# Patient Record
Sex: MALE | Race: WHITE | Employment: OTHER | URBAN - METROPOLITAN AREA
[De-identification: names, ages, dates, MRNs, and addresses within clinical notes are randomized per-mention and may not be internally consistent; named-entity substitution may affect disease eponyms.]

---

## 2017-02-21 ENCOUNTER — HOSPITAL ENCOUNTER (OUTPATIENT)
Dept: GENERAL RADIOLOGY | Age: 67
Discharge: HOME | End: 2017-02-21
Attending: GENERAL PRACTICE | Admitting: GENERAL PRACTICE

## 2017-02-21 DIAGNOSIS — R05.9 COUGH: ICD-10-CM

## 2021-01-09 ENCOUNTER — HOSPITAL ENCOUNTER (EMERGENCY)
Age: 71
Discharge: HOME | End: 2021-01-09
Attending: EMERGENCY MEDICINE
Payer: MEDICARE

## 2021-01-09 ENCOUNTER — APPOINTMENT (OUTPATIENT)
Dept: GENERAL RADIOLOGY | Age: 71
End: 2021-01-09
Payer: MEDICARE

## 2021-01-09 VITALS
OXYGEN SATURATION: 92 % | DIASTOLIC BLOOD PRESSURE: 81 MMHG | TEMPERATURE: 98.8 F | WEIGHT: 195 LBS | HEIGHT: 67 IN | BODY MASS INDEX: 30.61 KG/M2 | HEART RATE: 84 BPM | SYSTOLIC BLOOD PRESSURE: 144 MMHG | RESPIRATION RATE: 25 BRPM

## 2021-01-09 DIAGNOSIS — R53.83 OTHER FATIGUE: Primary | ICD-10-CM

## 2021-01-09 LAB
ALBUMIN SERPL-MCNC: 3.6 GM/DL (ref 3.4–5)
ALP BLD-CCNC: 44 IU/L (ref 40–129)
ALT SERPL-CCNC: 60 U/L (ref 10–40)
ANION GAP SERPL CALCULATED.3IONS-SCNC: 13 MMOL/L (ref 4–16)
AST SERPL-CCNC: 52 IU/L (ref 15–37)
BASOPHILS ABSOLUTE: 0 K/CU MM
BASOPHILS RELATIVE PERCENT: 0.2 % (ref 0–1)
BILIRUB SERPL-MCNC: 0.8 MG/DL (ref 0–1)
BUN BLDV-MCNC: 15 MG/DL (ref 6–23)
CALCIUM SERPL-MCNC: 8.8 MG/DL (ref 8.3–10.6)
CHLORIDE BLD-SCNC: 94 MMOL/L (ref 99–110)
CO2: 24 MMOL/L (ref 21–32)
CREAT SERPL-MCNC: 0.9 MG/DL (ref 0.9–1.3)
DIFFERENTIAL TYPE: ABNORMAL
EOSINOPHILS ABSOLUTE: 0 K/CU MM
EOSINOPHILS RELATIVE PERCENT: 0 % (ref 0–3)
GFR AFRICAN AMERICAN: >60 ML/MIN/1.73M2
GFR NON-AFRICAN AMERICAN: >60 ML/MIN/1.73M2
GLUCOSE BLD-MCNC: 211 MG/DL (ref 70–99)
GLUCOSE BLD-MCNC: 83 MG/DL (ref 70–99)
HCT VFR BLD CALC: 47.6 % (ref 42–52)
HEMOGLOBIN: 15.4 GM/DL (ref 13.5–18)
IMMATURE NEUTROPHIL %: 0.3 % (ref 0–0.43)
LYMPHOCYTES ABSOLUTE: 1.5 K/CU MM
LYMPHOCYTES RELATIVE PERCENT: 25 % (ref 24–44)
MCH RBC QN AUTO: 27.5 PG (ref 27–31)
MCHC RBC AUTO-ENTMCNC: 32.4 % (ref 32–36)
MCV RBC AUTO: 84.8 FL (ref 78–100)
MONOCYTES ABSOLUTE: 0.5 K/CU MM
MONOCYTES RELATIVE PERCENT: 7.7 % (ref 0–4)
NUCLEATED RBC %: 0 %
PDW BLD-RTO: 15 % (ref 11.7–14.9)
PLATELET # BLD: 141 K/CU MM (ref 140–440)
PMV BLD AUTO: 10.8 FL (ref 7.5–11.1)
POTASSIUM SERPL-SCNC: 3.7 MMOL/L (ref 3.5–5.1)
RBC # BLD: 5.61 M/CU MM (ref 4.6–6.2)
SEGMENTED NEUTROPHILS ABSOLUTE COUNT: 4 K/CU MM
SEGMENTED NEUTROPHILS RELATIVE PERCENT: 66.8 % (ref 36–66)
SODIUM BLD-SCNC: 131 MMOL/L (ref 135–145)
TOTAL IMMATURE NEUTOROPHIL: 0.02 K/CU MM
TOTAL NUCLEATED RBC: 0 K/CU MM
TOTAL PROTEIN: 7.3 GM/DL (ref 6.4–8.2)
TROPONIN T: <0.01 NG/ML
WBC # BLD: 6 K/CU MM (ref 4–10.5)

## 2021-01-09 PROCEDURE — 93005 ELECTROCARDIOGRAM TRACING: CPT | Performed by: EMERGENCY MEDICINE

## 2021-01-09 PROCEDURE — 82962 GLUCOSE BLOOD TEST: CPT

## 2021-01-09 PROCEDURE — 80053 COMPREHEN METABOLIC PANEL: CPT

## 2021-01-09 PROCEDURE — 93010 ELECTROCARDIOGRAM REPORT: CPT | Performed by: INTERNAL MEDICINE

## 2021-01-09 PROCEDURE — 71045 X-RAY EXAM CHEST 1 VIEW: CPT

## 2021-01-09 PROCEDURE — U0002 COVID-19 LAB TEST NON-CDC: HCPCS

## 2021-01-09 PROCEDURE — 85025 COMPLETE CBC W/AUTO DIFF WBC: CPT

## 2021-01-09 PROCEDURE — 99283 EMERGENCY DEPT VISIT LOW MDM: CPT

## 2021-01-09 PROCEDURE — 84484 ASSAY OF TROPONIN QUANT: CPT

## 2021-01-09 NOTE — ED NOTES
Discharge instructions and follow up reviewed with patient. Voiced understanding.      Ekta Diaz RN  01/09/21 7124

## 2021-01-09 NOTE — ED PROVIDER NOTES
Shellie CLEMENT [address] ENCOUNTER    Patient: Tsering Baker  MRN: [id number]  : 1950  Date of Evaluation: 2021  ED Provider:  [address] [facility] [place] [facility] COMPLAINT  Chief Complaint   Patient presents with    Fatigue     reports feeling tired for 3 days    Fever     99.1       HPI  Tsering Baker is a 65 y.o. male who presents with complaints of moderate severity, constant fatigue for the last couple of days as well as a mildly elevated temperature between 99.0 to 99.5 °F today. He is unaware of any triggering or modifying factors. He reports only the symptom of fatigue and denies any other associated symptoms or complaints or concerns. Denies any other associated symptoms or complaints or concerns. REVIEW OF SYSTEMS    Constitutional: negative for fever, chills  Neurological: negative for HA, focal weakness, loss of sensation  Ophthalmic: negative for vision change  ENT: negative for congestion, rhinorrhea, sore throat, earaches, loss of taste or smelling  Cardiovascular: negative for chest pain  Respiratory: negative for SOB, cough  GI: negative for abdominal pain, nausea, vomiting, diarrhea, constipation  : negative for dysuria, hematuria  Musculoskeletal: negative for neck stiffness, myalgias, decreased ROM, joint swelling  Dermatological: negative for rash, wounds  Heme: Negative for bleeding, bruising      PAST MEDICAL HISTORY  Past Medical History:   Diagnosis Date    Diabetes mellitus ([facility] [address]     Hyperlipidemia     Hypertension        CURRENT MEDICATIONS  [unfilled]    ALLERGIES  No Known Allergies    SURGICAL HISTORY  Past Surgical History:   Procedure Laterality Date    COLONOSCOPY  2015    normal colonoscopy    ENDOSCOPY, COLON, DIAGNOSTIC      prepyloric uler    UPPER GASTROINTESTINAL ENDOSCOPY         FAMILY HISTORY  History reviewed. No pertinent family history.     SOCIAL HISTORY  Social History     Socioeconomic History    Marital status:      Spouse name: None    Number of children: None    Years of education: None    Highest education level: None   Occupational History    None   Social Needs    Financial resource strain: None    Food insecurity     Worry: None     Inability: None    Transportation needs     Medical: None     Non-medical: None   Tobacco Use    Smoking status: Former Smoker    Smokeless tobacco: Never Used   Substance and Sexual Activity    Alcohol use: No    Drug use: No    Sexual activity: None   Lifestyle    Physical activity     Days per week: None     Minutes per session: None    Stress: None   Relationships    Social connections     Talks on phone: None     Gets together: None     Attends Sabianism service: None     Active member of club or organization: None     Attends meetings of clubs or organizations: None     Relationship status: None    Intimate partner violence     Fear of current or ex partner: None     Emotionally abused: None     Physically abused: None     Forced sexual activity: None   Other Topics Concern    None   Social History Narrative    None         **Past medical, family and social histories, and nursing notes reviewed and verified by me**      PHYSICAL EXAM  VITAL SIGNS:   ED Triage Vitals [01/09/21 1111]   Enc Vitals Group      BP (!) 147/83      Pulse 102      Resp 19      Temp 98.8 °F (37.1 °C)      Temp Source Oral      SpO2 96 %      Weight 195 lb (88.5 kg)      Height 5' 7\" (1.702 m)      Head Circumference       Peak Flow       Pain Score       Pain Loc       Pain Edu? Excl. in [address]? Vitals during ED course were reviewed and are as charted.     Constitutional: Minimal distress, Non-toxic appearance  Eyes: Conjunctiva normal, No discharge, PERRL, EOMI  HENT: Normocephalic, Atraumatic, bilateral external ears normal, bilateral TMs appear normal, no tenderness to percussion over the bilateral mastoid processes, no tenderness to percussion over the frontal or bilateral maxillary sinuses, posterior oropharynx is nonerythematous and without exudate, uvula is midline, no trismus, no \"hot potato voice\" or dysphonia, oropharynx moist  Neck: Supple, no nuchal rigidity/meningismus with negative Kernig and Brudzinski signs, no stridor, no grossly visible or palpable masses  Cardiovascular: Regular rate and rhythm, No murmurs, No rubs, No gallops  Pulmonary/Chest: Normal breath sounds, No respiratory distress or accessory muscle use, No wheezing, crackles or rhonchi. Abdomen: Soft, nondistended and nonrigid, No tenderness or peritoneal signs, No masses, normal bowel sounds  Back: No midline point tenderness, No paraspinous muscle tenderness.  No CVA tenderness  Extremities: No gross deformities, no edema, no tenderness  Neurologic: Normal motor function, Normal sensory function, No focal deficits  Skin: Warm, Dry, No erythema, No rash, No cyanosis, No mottling  Lymphatic: No lymphadenopathy in the following location(s): cervical  Psychiatric: Alert and oriented x3, Affect normal              RADIOLOGY/PROCEDURES/LABS/MEDICATIONS ADMINISTERED:    I have reviewed and interpreted all of the currently available lab results from this visit (if applicable):  Results for orders placed or performed during the hospital encounter of 01/09/21   CBC auto diff   Result Value Ref Range    WBC 6.0 4.0 - 10.5 K/CU MM    RBC 5.61 4.6 - 6.2 M/CU MM    Hemoglobin 15.4 13.5 - 18.0 GM/DL    Hematocrit 47.6 42 - 52 %    MCV 84.8 78 - 100 FL    MCH 27.5 27 - 31 PG    MCHC 32.4 32.0 - 36.0 %    RDW 15.0 (H) 11.7 - 14.9 %    Platelets 664 126 - 817 K/CU MM    MPV 10.8 7.5 - 11.1 FL    Differential Type AUTOMATED DIFFERENTIAL     Segs Relative 66.8 (H) 36 - 66 %    Lymphocytes % 25.0 24 - 44 %    Monocytes % 7.7 (H) 0 - 4 %    Eosinophils % 0.0 0 - 3 %    Basophils % 0.2 0 - 1 %    Segs Absolute 4.0 K/CU MM    Lymphocytes Absolute 1.5 K/CU MM    Monocytes Absolute 0.5 K/CU MM    Eosinophils Absolute 0.0 K/CU MM    Basophils Absolute 0.0 K/CU MM    Nucleated RBC % 0.0 %    Total Nucleated RBC 0.0 K/CU MM    Total Immature Neutrophil 0.02 K/CU MM    Immature Neutrophil % 0.3 0 - 0.43 %   Troponin   Result Value Ref Range    Troponin T <0.010 <0.01 NG/ML   Comprehensive Metabolic Panel   Result Value Ref Range    Sodium 131 (L) 135 - 145 MMOL/L    Potassium 3.7 3.5 - 5.1 MMOL/L    Chloride 94 (L) 99 - 110 mMol/L    CO2 24 21 - 32 MMOL/L    BUN 15 6 - 23 MG/DL    CREATININE 0.9 0.9 - 1.3 MG/DL    Glucose 83 70 - 99 MG/DL    Calcium 8.8 8.3 - 10.6 MG/DL    Alb 3.6 3.4 - 5.0 GM/DL    Total Protein 7.3 6.4 - 8.2 GM/DL    Total Bilirubin 0.8 0.0 - 1.0 MG/DL    ALT 60 (H) 10 - 40 U/L    AST 52 (H) 15 - 37 IU/L    Alkaline Phosphatase 44 40 - 129 IU/L    GFR Non-African American >60 >60 mL/min/1.73m2    GFR African American >60 >60 mL/min/1.73m2    Anion Gap 13 4 - 16   POCT Glucose   Result Value Ref Range    POC Glucose 211 (H) 70 - 99 MG/DL   EKG 12 Lead   Result Value Ref Range    Ventricular Rate 103 BPM    Atrial Rate 103 BPM    P-R Interval 152 ms    QRS Duration 88 ms    Q-T Interval 326 ms    QTc Calculation (Bazett) 427 ms    P Axis -3 degrees    R Axis -36 degrees    T Axis 56 degrees    Diagnosis       Sinus tachycardia  Left axis deviation  Minimal voltage criteria for LVH, may be normal variant  Abnormal ECG  No previous ECGs available  Confirmed by Jian Lofton MD, Hali Castillo ([id number]) on 1/9/2021 5:03:38 PM            ABNORMAL LABS:  Labs Reviewed   CBC WITH AUTO DIFFERENTIAL - Abnormal; Notable for the following components:       Result Value    RDW 15.0 (*)     Segs Relative 66.8 (*)     Monocytes % 7.7 (*)     All other components within normal limits   COMPREHENSIVE METABOLIC PANEL - Abnormal; Notable for the following components:    Sodium 131 (*)     Chloride 94 (*)     ALT 60 (*)     AST 52 (*)     All other components within normal limits   POCT GLUCOSE - Abnormal; Notable for the following components:    POC Glucose 211 (*)     All other Freddy Holbrook MD  [address] [facility] [phone]    Schedule an appointment as soon as possible for a visit       [facility] Emergency Department  [address] [place] [id number] [phone]    If symptoms worsen      Disposition medications (if applicable):  New Prescriptions    No medications on file       ED Provider Disposition Time  DISPOSITION            Electronically signed by: Erin Gonsalves M.D., 1/9/2021 6:15 PM      This dictation was created with voice recognition software. While attempts have been made to review the dictation as it is transcribed, on occasion the spoken word can be misinterpreted by the technology leading to omissions or inappropriate words, phrases or sentences.         Catherene Goltz, MD  01/10/21 1929

## 2021-01-09 NOTE — ED PROVIDER NOTES
EKG is interpreted by me. EKG shows sinus rhythm at 103 bpm, left axis deviation, unremarkable ST segment elevations or depressions, T waves overall unremarkable, VA interval 152, QRS duration of 88, QTc of 4-27. Final impression, sinus tachycardia.     Manjit Jones  1/9/2021  12:20 PM        Manjit oJnes MD  01/09/21 1220

## 2021-01-09 NOTE — ED NOTES
Patient present to the ED for fatigue that started 3 days ago. Sense then the patient has had a fever and HTN that started this AM. Patient is a diabetic and is BG is 211     Yen Kaur  01/09/21 1118

## 2021-01-11 ENCOUNTER — CARE COORDINATION (OUTPATIENT)
Dept: CARE COORDINATION | Age: 71
End: 2021-01-11

## 2021-01-11 LAB
SARS-COV-2: DETECTED
SOURCE: ABNORMAL

## 2021-01-11 NOTE — CARE COORDINATION
Patient contacted regarding recent visit for viral symptoms. This Jonas Durbin contacted the patient by telephone to perform post discharge call. Verified name and  with patient as identifiers. Provided introduction to self, and reason for call due to viral symptoms of infection and/or exposure to COVID-19. Call within 2 business days of discharge: Yes       Patient presented to emergency department/flu clinic with complaints of viral symptoms/exposure to COVID. Patient reports symptoms are the same. Due to no new or worsening symptoms the RN CTN/JOANNA was not notified for escalation. Discussed exposure protocols and quarantine with CDC Guidelines What To Do If You Are Sick    Patient was given an opportunity for questions and concerns. Stay home except to get medical care    Separate yourself from other people and animals in your home    Call ahead before visiting your doctor    Wear a facemask    Cover your coughs and sneezes    Clean your hands often    Avoid sharing personal household items    Clean all high-touch surfaces everyday    Monitor your symptoms  Seek prompt medical attention if your illness is worsening (e.g., difficulty breathing). Before seeking care, call your healthcare provider and tell them that you have, or are being evaluated for, COVID-19. Put on a facemask before you enter the facility. These steps will help the healthcare provider's office to keep other people in the office or waiting room from getting infected or exposed. Ask your healthcare provider to call the local or [facility] health department. Persons who are placed under If you have a medical emergency and need to call 911, notify the dispatch personnel that you have, or are being evaluated for COVID-19. If possible, put on a facemask before emergency medical services arrive.     The patient agrees to contact the [facility] exposure line [phone], local health department [place] [facility]: ([phone]) and PCP office for questions related to their healthcare. Author provided contact information for future reference. Patient/family/caregiver given information for [employer] and agrees to enroll no  Patient's preferred e-mail:    Patient's preferred phone number:   Based on Loop alert triggers, patient will be contacted by nurse care manager for worsening symptoms. Spoke with pt regarding ED visit on 1.9.21. pt reports feeling okay, just has a cough. Pt reports my chart is active to view COVID results. Pt hasn't followed up with PCP. Pt was given [place] [address] and red [facility] resources. Pt declined follow up calls for symptom recheck.      Marah Suresh  [phone]

## 2021-01-11 NOTE — CARE COORDINATION
Patient contacted regarding recent visit for viral symptoms. This author contacted the patient by telephone to perform post discharge call Call within 2 business days of discharge: Yes    Placed outreach call to pt regarding ED visit on 1.9.21. Left message to return call, will attempt to reach pt again.     Lucas Lynch  [phone]

## 2021-01-12 LAB
EKG ATRIAL RATE: 103 BPM
EKG DIAGNOSIS: NORMAL
EKG P AXIS: -3 DEGREES
EKG P-R INTERVAL: 152 MS
EKG Q-T INTERVAL: 326 MS
EKG QRS DURATION: 88 MS
EKG QTC CALCULATION (BAZETT): 427 MS
EKG R AXIS: -36 DEGREES
EKG T AXIS: 56 DEGREES
EKG VENTRICULAR RATE: 103 BPM

## 2021-01-24 ENCOUNTER — HOSPITAL ENCOUNTER (INPATIENT)
Age: 71
LOS: 6 days | Discharge: HOME | DRG: 177 | End: 2021-01-30
Attending: INTERNAL MEDICINE | Admitting: INTERNAL MEDICINE
Payer: MEDICARE

## 2021-01-24 ENCOUNTER — HOSPITAL ENCOUNTER (EMERGENCY)
Age: 71
Discharge: ANOTHER HOSPITAL | End: 2021-01-24
Attending: EMERGENCY MEDICINE
Payer: MEDICARE

## 2021-01-24 ENCOUNTER — APPOINTMENT (OUTPATIENT)
Dept: CT IMAGING | Age: 71
End: 2021-01-24
Payer: MEDICARE

## 2021-01-24 VITALS
DIASTOLIC BLOOD PRESSURE: 68 MMHG | RESPIRATION RATE: 15 BRPM | WEIGHT: 190 LBS | HEIGHT: 67 IN | TEMPERATURE: 97.5 F | HEART RATE: 87 BPM | OXYGEN SATURATION: 93 % | SYSTOLIC BLOOD PRESSURE: 110 MMHG | BODY MASS INDEX: 29.82 KG/M2

## 2021-01-24 DIAGNOSIS — J96.01 ACUTE RESPIRATORY FAILURE WITH HYPOXIA (HCC): Primary | ICD-10-CM

## 2021-01-24 DIAGNOSIS — R65.10 SIRS (SYSTEMIC INFLAMMATORY RESPONSE SYNDROME) (HCC): ICD-10-CM

## 2021-01-24 DIAGNOSIS — I77.810 AORTIC ECTASIA, THORACIC (HCC): ICD-10-CM

## 2021-01-24 DIAGNOSIS — K76.0 FATTY LIVER: ICD-10-CM

## 2021-01-24 DIAGNOSIS — J18.9 HCAP (HEALTHCARE-ASSOCIATED PNEUMONIA): ICD-10-CM

## 2021-01-24 DIAGNOSIS — U07.1 COVID-19: ICD-10-CM

## 2021-01-24 DIAGNOSIS — A41.9 SEPTICEMIA (HCC): ICD-10-CM

## 2021-01-24 LAB
ALBUMIN SERPL-MCNC: 3.1 GM/DL (ref 3.4–5)
ALP BLD-CCNC: 49 IU/L (ref 40–129)
ALT SERPL-CCNC: 45 U/L (ref 10–40)
ANION GAP SERPL CALCULATED.3IONS-SCNC: 5 MMOL/L (ref 4–16)
AST SERPL-CCNC: 27 IU/L (ref 15–37)
BASE EXCESS MIXED: 3.4 (ref 0–1.2)
BASE EXCESS: ABNORMAL (ref 0–3.3)
BASOPHILS ABSOLUTE: 0 K/CU MM
BASOPHILS RELATIVE PERCENT: 0.1 % (ref 0–1)
BILIRUB SERPL-MCNC: 1.9 MG/DL (ref 0–1)
BUN BLDV-MCNC: 40 MG/DL (ref 6–23)
CALCIUM SERPL-MCNC: 9.9 MG/DL (ref 8.3–10.6)
CHLORIDE BLD-SCNC: 92 MMOL/L (ref 99–110)
CO2 CONTENT: 29.9 MMOL/L (ref 19–24)
CO2: 35 MMOL/L (ref 21–32)
CREAT SERPL-MCNC: 1.4 MG/DL (ref 0.9–1.3)
DIFFERENTIAL TYPE: ABNORMAL
EOSINOPHILS ABSOLUTE: 0.2 K/CU MM
EOSINOPHILS RELATIVE PERCENT: 1.4 % (ref 0–3)
GFR AFRICAN AMERICAN: >60 ML/MIN/1.73M2
GFR NON-AFRICAN AMERICAN: 50 ML/MIN/1.73M2
GLUCOSE BLD-MCNC: 175 MG/DL (ref 70–99)
GLUCOSE BLD-MCNC: 500 MG/DL (ref 70–99)
HCO3 VENOUS: 28.6 MMOL/L (ref 19–25)
HCT VFR BLD CALC: 43.7 % (ref 42–52)
HEMOGLOBIN: 14.6 GM/DL (ref 13.5–18)
IMMATURE NEUTROPHIL %: 1.2 % (ref 0–0.43)
LYMPHOCYTES ABSOLUTE: 0.8 K/CU MM
LYMPHOCYTES RELATIVE PERCENT: 6.1 % (ref 24–44)
MCH RBC QN AUTO: 27.7 PG (ref 27–31)
MCHC RBC AUTO-ENTMCNC: 33.4 % (ref 32–36)
MCV RBC AUTO: 82.9 FL (ref 78–100)
MONOCYTES ABSOLUTE: 0.4 K/CU MM
MONOCYTES RELATIVE PERCENT: 2.9 % (ref 0–4)
O2 SAT, VEN: 51.3 % (ref 50–70)
PCO2, VEN: 44.1 MMHG (ref 38–52)
PDW BLD-RTO: 14.9 % (ref 11.7–14.9)
PH VENOUS: 7.42 (ref 7.32–7.42)
PLATELET # BLD: 218 K/CU MM (ref 140–440)
PMV BLD AUTO: 10.6 FL (ref 7.5–11.1)
PO2, VEN: 27.1 MMHG (ref 28–48)
POTASSIUM SERPL-SCNC: 4.6 MMOL/L (ref 3.5–5.1)
PRO-BNP: 78.66 PG/ML
RBC # BLD: 5.27 M/CU MM (ref 4.6–6.2)
SEGMENTED NEUTROPHILS ABSOLUTE COUNT: 11.6 K/CU MM
SEGMENTED NEUTROPHILS RELATIVE PERCENT: 88.3 % (ref 36–66)
SODIUM BLD-SCNC: 132 MMOL/L (ref 135–145)
SOURCE, BLOOD GAS: ABNORMAL
TOTAL IMMATURE NEUTOROPHIL: 0.16 K/CU MM
TOTAL PROTEIN: 7.1 GM/DL (ref 6.4–8.2)
TROPONIN T: <0.01 NG/ML
WBC # BLD: 13.1 K/CU MM (ref 4–10.5)

## 2021-01-24 PROCEDURE — 82436 ASSAY OF URINE CHLORIDE: CPT

## 2021-01-24 PROCEDURE — 99285 EMERGENCY DEPT VISIT HI MDM: CPT

## 2021-01-24 PROCEDURE — 6360000004 HC RX CONTRAST MEDICATION: Performed by: EMERGENCY MEDICINE

## 2021-01-24 PROCEDURE — 87449 NOS EACH ORGANISM AG IA: CPT

## 2021-01-24 PROCEDURE — 6360000002 HC RX W HCPCS: Performed by: EMERGENCY MEDICINE

## 2021-01-24 PROCEDURE — 80053 COMPREHEN METABOLIC PANEL: CPT

## 2021-01-24 PROCEDURE — 85025 COMPLETE CBC W/AUTO DIFF WBC: CPT

## 2021-01-24 PROCEDURE — 94640 AIRWAY INHALATION TREATMENT: CPT

## 2021-01-24 PROCEDURE — 83935 ASSAY OF URINE OSMOLALITY: CPT

## 2021-01-24 PROCEDURE — 83880 ASSAY OF NATRIURETIC PEPTIDE: CPT

## 2021-01-24 PROCEDURE — 96375 TX/PRO/DX INJ NEW DRUG ADDON: CPT

## 2021-01-24 PROCEDURE — 82570 ASSAY OF URINE CREATININE: CPT

## 2021-01-24 PROCEDURE — XW13325 TRANSFUSION OF CONVALESCENT PLASMA (NONAUTOLOGOUS) INTO PERIPHERAL VEIN, PERCUTANEOUS APPROACH, NEW TECHNOLOGY GROUP 5: ICD-10-PCS | Performed by: INTERNAL MEDICINE

## 2021-01-24 PROCEDURE — XW033E5 INTRODUCTION OF REMDESIVIR ANTI-INFECTIVE INTO PERIPHERAL VEIN, PERCUTANEOUS APPROACH, NEW TECHNOLOGY GROUP 5: ICD-10-PCS | Performed by: INTERNAL MEDICINE

## 2021-01-24 PROCEDURE — 1200000000 HC SEMI PRIVATE

## 2021-01-24 PROCEDURE — 87899 AGENT NOS ASSAY W/OPTIC: CPT

## 2021-01-24 PROCEDURE — 84484 ASSAY OF TROPONIN QUANT: CPT

## 2021-01-24 PROCEDURE — 86850 RBC ANTIBODY SCREEN: CPT

## 2021-01-24 PROCEDURE — 71275 CT ANGIOGRAPHY CHEST: CPT

## 2021-01-24 PROCEDURE — 93005 ELECTROCARDIOGRAM TRACING: CPT | Performed by: EMERGENCY MEDICINE

## 2021-01-24 PROCEDURE — 96365 THER/PROPH/DIAG IV INF INIT: CPT

## 2021-01-24 PROCEDURE — 84133 ASSAY OF URINE POTASSIUM: CPT

## 2021-01-24 PROCEDURE — 2580000003 HC RX 258: Performed by: EMERGENCY MEDICINE

## 2021-01-24 PROCEDURE — 83036 HEMOGLOBIN GLYCOSYLATED A1C: CPT

## 2021-01-24 PROCEDURE — 6370000000 HC RX 637 (ALT 250 FOR IP): Performed by: PHYSICIAN ASSISTANT

## 2021-01-24 PROCEDURE — 86900 BLOOD TYPING SEROLOGIC ABO: CPT

## 2021-01-24 PROCEDURE — 96367 TX/PROPH/DG ADDL SEQ IV INF: CPT

## 2021-01-24 PROCEDURE — 96366 THER/PROPH/DIAG IV INF ADDON: CPT

## 2021-01-24 PROCEDURE — 6360000002 HC RX W HCPCS: Performed by: INTERNAL MEDICINE

## 2021-01-24 PROCEDURE — 2580000003 HC RX 258: Performed by: INTERNAL MEDICINE

## 2021-01-24 PROCEDURE — 84300 ASSAY OF URINE SODIUM: CPT

## 2021-01-24 PROCEDURE — 82962 GLUCOSE BLOOD TEST: CPT

## 2021-01-24 PROCEDURE — 86901 BLOOD TYPING SEROLOGIC RH(D): CPT

## 2021-01-24 RX ORDER — METHYLPREDNISOLONE SODIUM SUCCINATE 125 MG/2ML
125 INJECTION, POWDER, LYOPHILIZED, FOR SOLUTION INTRAMUSCULAR; INTRAVENOUS ONCE
Status: COMPLETED | OUTPATIENT
Start: 2021-01-24 | End: 2021-01-24

## 2021-01-24 RX ORDER — ONDANSETRON 2 MG/ML
4 INJECTION INTRAMUSCULAR; INTRAVENOUS EVERY 6 HOURS PRN
Status: DISCONTINUED | OUTPATIENT
Start: 2021-01-24 | End: 2021-01-30 | Stop reason: HOSPADM

## 2021-01-24 RX ORDER — NICOTINE POLACRILEX 4 MG
15 LOZENGE BUCCAL PRN
Status: DISCONTINUED | OUTPATIENT
Start: 2021-01-24 | End: 2021-01-30 | Stop reason: HOSPADM

## 2021-01-24 RX ORDER — DEXTROSE MONOHYDRATE 25 G/50ML
12.5 INJECTION, SOLUTION INTRAVENOUS PRN
Status: DISCONTINUED | OUTPATIENT
Start: 2021-01-24 | End: 2021-01-24 | Stop reason: SDUPTHER

## 2021-01-24 RX ORDER — DILTIAZEM HYDROCHLORIDE 180 MG/1
CAPSULE, EXTENDED RELEASE ORAL
Status: ON HOLD | COMMUNITY
Start: 2020-07-29 | End: 2021-01-30 | Stop reason: HOSPADM

## 2021-01-24 RX ORDER — FAMCICLOVIR 500 MG/1
TABLET, FILM COATED ORAL
COMMUNITY

## 2021-01-24 RX ORDER — DEXTROSE MONOHYDRATE 50 MG/ML
100 INJECTION, SOLUTION INTRAVENOUS PRN
Status: DISCONTINUED | OUTPATIENT
Start: 2021-01-24 | End: 2021-01-24 | Stop reason: SDUPTHER

## 2021-01-24 RX ORDER — DEXTROSE MONOHYDRATE 50 MG/ML
100 INJECTION, SOLUTION INTRAVENOUS PRN
Status: DISCONTINUED | OUTPATIENT
Start: 2021-01-24 | End: 2021-01-30 | Stop reason: HOSPADM

## 2021-01-24 RX ORDER — NICOTINE POLACRILEX 4 MG
15 LOZENGE BUCCAL PRN
Status: DISCONTINUED | OUTPATIENT
Start: 2021-01-24 | End: 2021-01-24 | Stop reason: SDUPTHER

## 2021-01-24 RX ORDER — ALBUTEROL SULFATE 2.5 MG/3ML
2.5 SOLUTION RESPIRATORY (INHALATION)
Status: DISCONTINUED | OUTPATIENT
Start: 2021-01-24 | End: 2021-01-24 | Stop reason: HOSPADM

## 2021-01-24 RX ORDER — LEVOFLOXACIN 500 MG/1
500 TABLET, FILM COATED ORAL DAILY
Status: ON HOLD | COMMUNITY
Start: 2021-01-15 | End: 2021-01-30 | Stop reason: HOSPADM

## 2021-01-24 RX ORDER — MAGNESIUM SULFATE IN WATER 40 MG/ML
2000 INJECTION, SOLUTION INTRAVENOUS ONCE
Status: COMPLETED | OUTPATIENT
Start: 2021-01-24 | End: 2021-01-24

## 2021-01-24 RX ORDER — GLIMEPIRIDE 2 MG/1
2 TABLET ORAL 2 TIMES DAILY
COMMUNITY
Start: 2020-11-12

## 2021-01-24 RX ORDER — QUINAPRIL HCL AND HYDROCHLOROTHIAZIDE 20; 25 MG/1; MG/1
1 TABLET ORAL DAILY
Status: DISCONTINUED | OUTPATIENT
Start: 2021-01-25 | End: 2021-01-25

## 2021-01-24 RX ORDER — SODIUM CHLORIDE 9 MG/ML
INJECTION, SOLUTION INTRAVENOUS CONTINUOUS
Status: DISCONTINUED | OUTPATIENT
Start: 2021-01-24 | End: 2021-01-26

## 2021-01-24 RX ORDER — SODIUM CHLORIDE 0.9 % (FLUSH) 0.9 %
10 SYRINGE (ML) INJECTION EVERY 12 HOURS SCHEDULED
Status: DISCONTINUED | OUTPATIENT
Start: 2021-01-24 | End: 2021-01-30 | Stop reason: HOSPADM

## 2021-01-24 RX ORDER — DEXTROSE MONOHYDRATE 25 G/50ML
12.5 INJECTION, SOLUTION INTRAVENOUS PRN
Status: DISCONTINUED | OUTPATIENT
Start: 2021-01-24 | End: 2021-01-30 | Stop reason: HOSPADM

## 2021-01-24 RX ORDER — SODIUM CHLORIDE 0.9 % (FLUSH) 0.9 %
10 SYRINGE (ML) INJECTION PRN
Status: DISCONTINUED | OUTPATIENT
Start: 2021-01-24 | End: 2021-01-30 | Stop reason: HOSPADM

## 2021-01-24 RX ORDER — GUAIFENESIN AND CODEINE PHOSPHATE 100; 10 MG/5ML; MG/5ML
5 SOLUTION ORAL EVERY 4 HOURS PRN
COMMUNITY
Start: 2021-01-15 | End: 2021-02-14

## 2021-01-24 RX ORDER — GLIMEPIRIDE 2 MG/1
2 TABLET ORAL 2 TIMES DAILY WITH MEALS
Status: DISCONTINUED | OUTPATIENT
Start: 2021-01-25 | End: 2021-01-25

## 2021-01-24 RX ORDER — DEXAMETHASONE 4 MG/1
6 TABLET ORAL DAILY
Status: DISCONTINUED | OUTPATIENT
Start: 2021-01-25 | End: 2021-01-25

## 2021-01-24 RX ORDER — GUAIFENESIN AND CODEINE PHOSPHATE 100; 10 MG/5ML; MG/5ML
5 SOLUTION ORAL EVERY 6 HOURS PRN
Status: DISCONTINUED | OUTPATIENT
Start: 2021-01-24 | End: 2021-01-30 | Stop reason: HOSPADM

## 2021-01-24 RX ORDER — POLYETHYLENE GLYCOL 3350 17 G/17G
17 POWDER, FOR SOLUTION ORAL DAILY PRN
Status: DISCONTINUED | OUTPATIENT
Start: 2021-01-24 | End: 2021-01-30 | Stop reason: HOSPADM

## 2021-01-24 RX ORDER — DILTIAZEM HYDROCHLORIDE 180 MG/1
180 CAPSULE, COATED, EXTENDED RELEASE ORAL DAILY
Status: DISCONTINUED | OUTPATIENT
Start: 2021-01-25 | End: 2021-01-27

## 2021-01-24 RX ORDER — ATORVASTATIN CALCIUM 40 MG/1
40 TABLET, FILM COATED ORAL DAILY
Status: DISCONTINUED | OUTPATIENT
Start: 2021-01-25 | End: 2021-01-30 | Stop reason: HOSPADM

## 2021-01-24 RX ORDER — ALBUTEROL SULFATE 2.5 MG/3ML
15 SOLUTION RESPIRATORY (INHALATION)
Status: DISCONTINUED | OUTPATIENT
Start: 2021-01-24 | End: 2021-01-24 | Stop reason: HOSPADM

## 2021-01-24 RX ORDER — PROMETHAZINE HYDROCHLORIDE 25 MG/1
12.5 TABLET ORAL EVERY 6 HOURS PRN
Status: DISCONTINUED | OUTPATIENT
Start: 2021-01-24 | End: 2021-01-30 | Stop reason: HOSPADM

## 2021-01-24 RX ORDER — ACETAMINOPHEN 325 MG/1
650 TABLET ORAL EVERY 6 HOURS PRN
Status: DISCONTINUED | OUTPATIENT
Start: 2021-01-24 | End: 2021-01-30 | Stop reason: HOSPADM

## 2021-01-24 RX ORDER — SODIUM CHLORIDE 9 MG/ML
INJECTION, SOLUTION INTRAVENOUS PRN
Status: DISCONTINUED | OUTPATIENT
Start: 2021-01-24 | End: 2021-01-30 | Stop reason: HOSPADM

## 2021-01-24 RX ORDER — ATORVASTATIN CALCIUM 40 MG/1
40 TABLET, FILM COATED ORAL DAILY
COMMUNITY

## 2021-01-24 RX ORDER — 0.9 % SODIUM CHLORIDE 0.9 %
30 INTRAVENOUS SOLUTION INTRAVENOUS PRN
Status: DISCONTINUED | OUTPATIENT
Start: 2021-01-24 | End: 2021-01-30 | Stop reason: HOSPADM

## 2021-01-24 RX ADMIN — SODIUM CHLORIDE: 9 INJECTION, SOLUTION INTRAVENOUS at 22:49

## 2021-01-24 RX ADMIN — ENOXAPARIN SODIUM 30 MG: 30 INJECTION SUBCUTANEOUS at 22:49

## 2021-01-24 RX ADMIN — SODIUM CHLORIDE, PRESERVATIVE FREE 10 ML: 5 INJECTION INTRAVENOUS at 23:08

## 2021-01-24 RX ADMIN — CEFEPIME HYDROCHLORIDE 1000 MG: 1 INJECTION, POWDER, FOR SOLUTION INTRAMUSCULAR; INTRAVENOUS at 23:24

## 2021-01-24 RX ADMIN — SODIUM CHLORIDE: 9 INJECTION, SOLUTION INTRAVENOUS at 22:53

## 2021-01-24 RX ADMIN — INSULIN HUMAN 5 UNITS: 100 INJECTION, SOLUTION PARENTERAL at 23:16

## 2021-01-24 RX ADMIN — CEFEPIME 1000 MG: 1 INJECTION, POWDER, FOR SOLUTION INTRAMUSCULAR; INTRAVENOUS at 16:01

## 2021-01-24 RX ADMIN — METHYLPREDNISOLONE SODIUM SUCCINATE 125 MG: 125 INJECTION, POWDER, FOR SOLUTION INTRAMUSCULAR; INTRAVENOUS at 11:19

## 2021-01-24 RX ADMIN — MAGNESIUM SULFATE HEPTAHYDRATE 2000 MG: 40 INJECTION, SOLUTION INTRAVENOUS at 11:20

## 2021-01-24 RX ADMIN — VANCOMYCIN HYDROCHLORIDE 1500 MG: 1 INJECTION, POWDER, LYOPHILIZED, FOR SOLUTION INTRAVENOUS at 16:48

## 2021-01-24 RX ADMIN — ALBUTEROL SULFATE 2.5 MG: 2.5 SOLUTION RESPIRATORY (INHALATION) at 11:36

## 2021-01-24 RX ADMIN — IOPAMIDOL 75 ML: 755 INJECTION, SOLUTION INTRAVENOUS at 14:05

## 2021-01-24 ASSESSMENT — PAIN SCALES - GENERAL
PAINLEVEL_OUTOF10: 0
PAINLEVEL_OUTOF10: 0

## 2021-01-24 NOTE — ED NOTES
Bed: 01  Expected date: 1/24/21  Expected time: 10:18 AM  Means of arrival:   Comments:  Covid positive     Anna Moss RN  01/24/21 1048

## 2021-01-24 NOTE — ED PROVIDER NOTES
Q8H Linden Garcia,  mL/hr at 01/24/21 1601 1,000 mg at 01/24/21 1601     Current Outpatient Medications   Medication Sig Dispense Refill    fenofibrate (TRICOR) 145 MG tablet Take 145 mg by mouth daily.  metformin (GLUCOPHAGE) 500 MG tablet Take 500 mg by mouth 3 times daily.  glipiZIDE (GLUCOTROL) 10 MG tablet Take 10 mg by mouth 2 times daily (before meals).  quinapril-hydrochlorothiazide (ACCURETIC) 20-25 MG per tablet Take 1 tablet by mouth daily.  esomeprazole (NEXIUM) 40 MG capsule Take 1 capsule by mouth daily for 30 days. 30 capsule 1       No Known Allergies      Nursing Notes Reviewed    Physical Exam:  Triage VS:    ED Triage Vitals   Enc Vitals Group      BP 01/24/21 1051 126/71      Pulse 01/24/21 1101 86      Resp 01/24/21 1101 24      Temp 01/24/21 1101 97.5 °F (36.4 °C)      Temp Source 01/24/21 1101 Oral      SpO2 01/24/21 1051 (!) 81 % room air      Weight 01/24/21 1101 190 lb (86.2 kg)      Height 01/24/21 1101 5' 7\" (1.702 m)     My pulse ox interpretation is -  Hypoxic, requiring 6L 02    General appearance: Patient is awake and alert. Following commands and answering questions. GCS is 15. No drooling, stridor, hoarseness, tripoding. Patient does appear to be in respiratory distress with 2-4  word conversational dyspnea. Skin:  Warm. Dry. Intact. No petechiae or purpura. No herpes zoster lesions. Eye: Pupils are equal, round, reactive. Extraocular movements are intact. No eyelid pallor. No nystagmus or gaze deviation. Head, ears, nose, mouth and throat: Head is normocephalic and atraumatic. There are no external masses or lesions. No nasal drainage. Pharynx is clear and non-erythematous. Airway is patent. No tonsillar enlargement. No tongue or lip edema. No uvular deviation. Neck: Supple. No nuchal rigidity. Trachea is midline. No masses or thyromegaly or lymphadenopathy. No JVD. No carotid thrills or bruits. No subcutaneous emphysema. (L) 99 - 110 mMol/L    CO2 35 (H) 21 - 32 MMOL/L    BUN 40 (H) 6 - 23 MG/DL    CREATININE 1.4 (H) 0.9 - 1.3 MG/DL    Glucose 175 (H) 70 - 99 MG/DL    Calcium 9.9 8.3 - 10.6 MG/DL    Alb 3.1 (L) 3.4 - 5.0 GM/DL    Total Protein 7.1 6.4 - 8.2 GM/DL    Total Bilirubin 1.9 (H) 0.0 - 1.0 MG/DL    ALT 45 (H) 10 - 40 U/L    AST 27 15 - 37 IU/L    Alkaline Phosphatase 49 40 - 129 IU/L    GFR Non- 50 (L) >60 mL/min/1.73m2    GFR African American >60 >60 mL/min/1.73m2    Anion Gap 5 4 - 16   Troponin   Result Value Ref Range    Troponin T <0.010 <0.01 NG/ML   Brain Natriuretic Peptide   Result Value Ref Range    Pro-BNP 78.66 <300 PG/ML   EKG 12 Lead   Result Value Ref Range    Ventricular Rate 82 BPM    Atrial Rate 82 BPM    P-R Interval 154 ms    QRS Duration 90 ms    Q-T Interval 360 ms    QTc Calculation (Bazett) 420 ms    P Axis 24 degrees    R Axis -28 degrees    T Axis 37 degrees    Diagnosis       Normal sinus rhythm  Moderate voltage criteria for LVH, may be normal variant  Borderline ECG  When compared with ECG of 09-JAN-2021 11:14,  No significant change was found          Radiographs:  Radiologist's Report Reviewed:  Cta Pulmonary W Contrast    Result Date: 1/24/2021  EXAMINATION: CTA OF THE CHEST 1/24/2021 1:44 pm TECHNIQUE: CTA of the chest was performed after the administration of intravenous contrast.  Multiplanar reformatted images are provided for review. MIP images are provided for review. Dose modulation, iterative reconstruction, and/or weight based adjustment of the mA/kV was utilized to reduce the radiation dose to as low as reasonably achievable.  COMPARISON: Portable chest from 01/09/2021 HISTORY: ORDERING SYSTEM PROVIDED HISTORY: FERNANDA TECHNOLOGIST PROVIDED HISTORY: PE Reason for exam:->FERNANDA Acuity: Acute Type of Exam: Initial Additional signs and symptoms: (c/o increased SOB during the night,was released from hospital 1/13/2021), covid +, iv contrast isovue 370 75ml in RT AC @ 1402 hmj 39-year-old male with increased shortness of breath during the night; COVID positive. FINDINGS: Exam is severely limited due to respiratory motion. Pulmonary Arteries: No obvious filling defect in the right main, central main, or left main pulmonary arteries. Evaluation of the remainder of the pulmonary arterial vasculature is severely limited to essentially non diagnostic due to respiratory motion and airspace disease. Segmental and subsegmental pulmonary emboli difficult to exclude on the basis of this limited study. Mediastinum: Atherosclerotic calcification and atheromatous plaque of the thoracic aorta and branch vasculature. Ectasia of the ascending thoracic aorta. Cardiomegaly. No pericardial or pleural effusions. No axillary, mediastinal, or hilar lymphadenopathy. Visualized thyroid gland grossly unremarkable in appearance. Lungs/pleura: Trachea and proximal central airways appear patent. Extensive respiratory motion. Diffuse ground-glass opacities throughout both lungs consistent with atypical multifocal pneumonia to include COVID. Upper Abdomen: Fatty liver. Atherosclerotic calcification of the upper abdominal aorta. Evaluation of the upper abdomen is limited due to respiratory motion. Soft Tissues/Bones: Mild diffuse degenerative changes throughout the spine. 1. Atypical/viral multifocal pneumonia consistent with patient's history of COVID. 2. No central pulmonary embolus. Evaluation of the remainder of the pulmonary arterial vasculature is severely limited to essentially non diagnostic due to respiratory motion airspace disease. Segmental and subsegmental pulmonary emboli difficult to exclude on the basis of this limited study. If there is persistent concern regarding segmental and subsegmental pulmonary emboli, consider V/Q scan. 3. Cardiomegaly. Ectasia of the ascending thoracic aorta. 4. Fatty liver. 5. Exam limited due to respiratory motion.        EKG: (All EKG's are interpreted by myself in the absence of a cardiologist). EKG performed on 1/24/2021 at 1104 demonstrates ventricular rate of 82 bpm in sinus rhythm. No ST ovation's or depressions. No signs of acute ischemia, infarct, high degree heart block. MDM:  Patient was seen and evaluated in the emergency department by myself. A thorough history and physical exam were performed, prior medical records are reviewed. Upon arrival to the emergency department, patient's vital signs were noted. No tachycardia. Patient is tachypneic 24 respirations per minute. He is hypoxic upon arrival with an O2 saturation of 79% on room air. He is initiated on 6 L of oxygen via nasal cannula and O2 saturation improves to 81%. Blood pressure stable 126/71. He is afebrile. Patient was connected to continuous cardiopulmonary monitoring. Rhythm strip was interpreted by myself demonstrating sinus rhythm. EKG is performed, interpreted by myself, as detailed above. Differential diagnoses and treatment plan were discussed. Respiratory therapy is called to bedside to administer bronchospasm protocol. IV access is established. 125 mg IV Solu-Medrol and 2 g of magnesium are provided. Pertinent labs are drawn and radiographic studies are performed. Once results are available they reviewed by myself. Labs demonstrate leukocytosis with a white blood cell count 13.1. No anemia or thrombocytopenia. Electrolytes demonstrate hyponatremia sodium 132. Potassium is within normal limits. Patient is hypochloremic with a chloride of 92. Bicarb is 35. BUN is 40. Creatinine 1.4. AST within normal limits. ALT elevated at 45. Troponin less than 0.010. proBNP 78.66.  CT angiography pulmonary with IV contrast radiology report reads atypical viral multifocal pneumonia consistent with patient's history of COVID-19. No central pulmonary embolus. Cardiomegaly. Ectasia of the ascending thoracic aorta. Fatty liver.      On repeat evaluations, patient remains hemodynamically stable. With 6 L of supplemental oxygen, O2 saturation remains above 92%. Patient does not appear to be respiratory distress. He is resting comfortably, no drooling, stridor, hoarseness. Results of laboratory and radiographic data along with differential diagnosis and treatment plan were discussed with the patient. He presents with difficulty in breathing. Symptoms concerning for hypoxic respiratory failure. Patient does have sirs criteria with sepsis secondary to pneumonia which is likely from COVID-19 virus. Comycin and cefepime are initiated. In addition, patient has acute kidney injury. He does have a ascending thoracic aortic ectasia and fatty liver. All these incidental findings are discussed with the patient. Given his symptoms, we recommend admission. As patient is Covid positive, case is discussed with hospitalist at [facility] Dr. Shaq Goodwin who is agreeable with treatment plan and accepts transfer. Transfer paperwork and EMTALA paperwork are completed. Patient is currently in the emergency department, in stable condition, awaiting bed at [facility] in transport via [address] ambulance. Clinical Impression:  1. Acute respiratory failure with hypoxia ([facility] [address]    2. SIRS (systemic inflammatory response syndrome) (HCC)    3. Septicemia ([facility] [address]    4. HCAP (healthcare-associated pneumonia)    5. COVID-19    6. Aortic ectasia, thoracic (HCC)    7. Fatty liver        Critical Care Note:  Total critical care time provided today was 32 minutes. This excludes seperately billable procedures and family discussion time. Critical care time provided for obtaining history, conducting a physical exam, performing and monitoring interventions, ordering, collecting and interpreting tests, and establishing medical decision-making.   There was a potential for life/limb threatening pathology requiring close evaluation and intervention with concern for patient decompensation. ED Provider Disposition:  DISPOSITION Decision To Transfer 01/24/2021 03:06:08 PM      Comment: Please note this report has been produced using speech recognition software and may contain errors related to that system including errors in grammar, punctuation, and spelling, as well as words and phrases that may be inappropriate. Efforts were made to edit the dictations.        [address] [facility],   01/24/21 7537

## 2021-01-24 NOTE — ED NOTES
Called access center.  States that they are cleaning the room now and should be done within the next 30-45 minutes     Polly Cates  01/24/21 1626

## 2021-01-24 NOTE — ED NOTES
Called access center.  Stated that the bed is getting cleaned and would call once it was ready      Katherin Cates  01/24/21 6081

## 2021-01-25 LAB
ABO/RH: NORMAL
ALBUMIN SERPL-MCNC: 2.8 GM/DL (ref 3.4–5)
ALBUMIN SERPL-MCNC: 2.8 GM/DL (ref 3.4–5)
ALP BLD-CCNC: 42 IU/L (ref 40–128)
ALP BLD-CCNC: 42 IU/L (ref 40–129)
ALT SERPL-CCNC: 36 U/L (ref 10–40)
ALT SERPL-CCNC: 36 U/L (ref 10–40)
ANION GAP SERPL CALCULATED.3IONS-SCNC: 10 MMOL/L (ref 4–16)
ANTIBODY SCREEN: NEGATIVE
APTT: 40.5 SECONDS (ref 25.1–37.1)
AST SERPL-CCNC: 18 IU/L (ref 15–37)
AST SERPL-CCNC: 18 IU/L (ref 15–37)
BASOPHILS ABSOLUTE: 0 K/CU MM
BASOPHILS RELATIVE PERCENT: 0.1 % (ref 0–1)
BILIRUB SERPL-MCNC: 0.8 MG/DL (ref 0–1)
BILIRUB SERPL-MCNC: 0.8 MG/DL (ref 0–1)
BILIRUBIN DIRECT: 0.2 MG/DL (ref 0–0.3)
BILIRUBIN, INDIRECT: 0.6 MG/DL (ref 0–0.7)
BUN BLDV-MCNC: 50 MG/DL (ref 6–23)
CALCIUM SERPL-MCNC: 8.7 MG/DL (ref 8.3–10.6)
CHLORIDE BLD-SCNC: 95 MMOL/L (ref 99–110)
CHLORIDE URINE RANDOM: 19 MMOL/L (ref 43–210)
CO2: 25 MMOL/L (ref 21–32)
CREAT SERPL-MCNC: 1.4 MG/DL (ref 0.9–1.3)
CREATININE URINE: 82.2 MG/DL
D DIMER: 893 NG/ML(DDU)
DIFFERENTIAL TYPE: ABNORMAL
EOSINOPHILS ABSOLUTE: 0 K/CU MM
EOSINOPHILS RELATIVE PERCENT: 0 % (ref 0–3)
ESTIMATED AVERAGE GLUCOSE: 217 MG/DL
FERRITIN: 1848 NG/ML (ref 30–400)
FIBRINOGEN LEVEL: 610 MG/DL (ref 196.9–442.1)
GFR AFRICAN AMERICAN: >60 ML/MIN/1.73M2
GFR NON-AFRICAN AMERICAN: 50 ML/MIN/1.73M2
GLUCOSE BLD-MCNC: 288 MG/DL (ref 70–99)
GLUCOSE BLD-MCNC: 297 MG/DL (ref 70–99)
GLUCOSE BLD-MCNC: 304 MG/DL (ref 70–99)
GLUCOSE BLD-MCNC: 357 MG/DL (ref 70–99)
GLUCOSE BLD-MCNC: 404 MG/DL (ref 70–99)
GLUCOSE BLD-MCNC: 418 MG/DL (ref 70–99)
HBA1C MFR BLD: 9.2 % (ref 4.2–6.3)
HCT VFR BLD CALC: 38.3 % (ref 42–52)
HEMOGLOBIN: 12.7 GM/DL (ref 13.5–18)
HIGH SENSITIVE C-REACTIVE PROTEIN: 230.2 MG/L
IMMATURE NEUTROPHIL %: 0.8 % (ref 0–0.43)
INR BLD: 1.05 INDEX
LACTATE DEHYDROGENASE: 206 IU/L (ref 120–246)
LACTATE: 2 MMOL/L (ref 0.4–2)
LEGIONELLA URINARY AG: NEGATIVE
LEGIONELLA URINARY AG: NEGATIVE
LYMPHOCYTES ABSOLUTE: 0.5 K/CU MM
LYMPHOCYTES RELATIVE PERCENT: 4.1 % (ref 24–44)
MCH RBC QN AUTO: 27.7 PG (ref 27–31)
MCHC RBC AUTO-ENTMCNC: 33.2 % (ref 32–36)
MCV RBC AUTO: 83.6 FL (ref 78–100)
MONOCYTES ABSOLUTE: 0.3 K/CU MM
MONOCYTES RELATIVE PERCENT: 2.2 % (ref 0–4)
NUCLEATED RBC %: 0 %
OSMOLALITY URINE: 740 MOS/L (ref 292–1090)
PDW BLD-RTO: 14.7 % (ref 11.7–14.9)
PLATELET # BLD: 181 K/CU MM (ref 140–440)
PMV BLD AUTO: 10.9 FL (ref 7.5–11.1)
POTASSIUM SERPL-SCNC: 4.9 MMOL/L (ref 3.5–5.1)
POTASSIUM, UR: 81.6 MMOL/L (ref 22–119)
PROCALCITONIN: 0.27
PROTHROMBIN TIME: 12.7 SECONDS (ref 11.7–14.5)
RBC # BLD: 4.58 M/CU MM (ref 4.6–6.2)
SEGMENTED NEUTROPHILS ABSOLUTE COUNT: 11.8 K/CU MM
SEGMENTED NEUTROPHILS RELATIVE PERCENT: 92.8 % (ref 36–66)
SODIUM BLD-SCNC: 130 MMOL/L (ref 135–145)
SODIUM URINE: 27 MMOL/L (ref 35–167)
STREP PNEUMONIAE ANTIGEN: NORMAL
STREP PNEUMONIAE ANTIGEN: NORMAL
TOTAL IMMATURE NEUTOROPHIL: 0.1 K/CU MM
TOTAL NUCLEATED RBC: 0 K/CU MM
TOTAL PROTEIN: 5.7 GM/DL (ref 6.4–8.2)
TOTAL PROTEIN: 5.7 GM/DL (ref 6.4–8.2)
TROPONIN T: <0.01 NG/ML
VITAMIN D 25-HYDROXY: 31.34 NG/ML
WBC # BLD: 12.7 K/CU MM (ref 4–10.5)

## 2021-01-25 PROCEDURE — 82306 VITAMIN D 25 HYDROXY: CPT

## 2021-01-25 PROCEDURE — 84145 PROCALCITONIN (PCT): CPT

## 2021-01-25 PROCEDURE — 82248 BILIRUBIN DIRECT: CPT

## 2021-01-25 PROCEDURE — 82962 GLUCOSE BLOOD TEST: CPT

## 2021-01-25 PROCEDURE — 87070 CULTURE OTHR SPECIMN AEROBIC: CPT

## 2021-01-25 PROCEDURE — 85379 FIBRIN DEGRADATION QUANT: CPT

## 2021-01-25 PROCEDURE — 83605 ASSAY OF LACTIC ACID: CPT

## 2021-01-25 PROCEDURE — 2700000000 HC OXYGEN THERAPY PER DAY

## 2021-01-25 PROCEDURE — 2500000003 HC RX 250 WO HCPCS: Performed by: INTERNAL MEDICINE

## 2021-01-25 PROCEDURE — 87449 NOS EACH ORGANISM AG IA: CPT

## 2021-01-25 PROCEDURE — 93308 TTE F-UP OR LMTD: CPT

## 2021-01-25 PROCEDURE — 36415 COLL VENOUS BLD VENIPUNCTURE: CPT

## 2021-01-25 PROCEDURE — 87205 SMEAR GRAM STAIN: CPT

## 2021-01-25 PROCEDURE — 83615 LACTATE (LD) (LDH) ENZYME: CPT

## 2021-01-25 PROCEDURE — 82728 ASSAY OF FERRITIN: CPT

## 2021-01-25 PROCEDURE — 87899 AGENT NOS ASSAY W/OPTIC: CPT

## 2021-01-25 PROCEDURE — 86769 SARS-COV-2 COVID-19 ANTIBODY: CPT

## 2021-01-25 PROCEDURE — 86141 C-REACTIVE PROTEIN HS: CPT

## 2021-01-25 PROCEDURE — 85025 COMPLETE CBC W/AUTO DIFF WBC: CPT

## 2021-01-25 PROCEDURE — 80053 COMPREHEN METABOLIC PANEL: CPT

## 2021-01-25 PROCEDURE — 85384 FIBRINOGEN ACTIVITY: CPT

## 2021-01-25 PROCEDURE — 85730 THROMBOPLASTIN TIME PARTIAL: CPT

## 2021-01-25 PROCEDURE — 85610 PROTHROMBIN TIME: CPT

## 2021-01-25 PROCEDURE — 6360000002 HC RX W HCPCS: Performed by: INTERNAL MEDICINE

## 2021-01-25 PROCEDURE — 80048 BASIC METABOLIC PNL TOTAL CA: CPT

## 2021-01-25 PROCEDURE — 87081 CULTURE SCREEN ONLY: CPT

## 2021-01-25 PROCEDURE — 93010 ELECTROCARDIOGRAM REPORT: CPT | Performed by: INTERNAL MEDICINE

## 2021-01-25 PROCEDURE — 1200000000 HC SEMI PRIVATE

## 2021-01-25 PROCEDURE — 6370000000 HC RX 637 (ALT 250 FOR IP): Performed by: INTERNAL MEDICINE

## 2021-01-25 PROCEDURE — B246ZZZ ULTRASONOGRAPHY OF RIGHT AND LEFT HEART: ICD-10-PCS | Performed by: INTERNAL MEDICINE

## 2021-01-25 PROCEDURE — 94761 N-INVAS EAR/PLS OXIMETRY MLT: CPT

## 2021-01-25 PROCEDURE — 84484 ASSAY OF TROPONIN QUANT: CPT

## 2021-01-25 PROCEDURE — 2580000003 HC RX 258: Performed by: INTERNAL MEDICINE

## 2021-01-25 PROCEDURE — 80202 ASSAY OF VANCOMYCIN: CPT

## 2021-01-25 RX ORDER — METHYLPREDNISOLONE SODIUM SUCCINATE 40 MG/ML
40 INJECTION, POWDER, LYOPHILIZED, FOR SOLUTION INTRAMUSCULAR; INTRAVENOUS DAILY
Status: DISCONTINUED | OUTPATIENT
Start: 2021-01-26 | End: 2021-01-30 | Stop reason: HOSPADM

## 2021-01-25 RX ORDER — INSULIN GLARGINE 100 [IU]/ML
20 INJECTION, SOLUTION SUBCUTANEOUS NIGHTLY
Status: DISCONTINUED | OUTPATIENT
Start: 2021-01-25 | End: 2021-01-30 | Stop reason: HOSPADM

## 2021-01-25 RX ADMIN — INSULIN LISPRO 9 UNITS: 100 INJECTION, SOLUTION INTRAVENOUS; SUBCUTANEOUS at 16:49

## 2021-01-25 RX ADMIN — DEXAMETHASONE 6 MG: 4 TABLET ORAL at 09:07

## 2021-01-25 RX ADMIN — CEFEPIME HYDROCHLORIDE 1000 MG: 1 INJECTION, POWDER, FOR SOLUTION INTRAMUSCULAR; INTRAVENOUS at 09:06

## 2021-01-25 RX ADMIN — SODIUM CHLORIDE, PRESERVATIVE FREE 10 ML: 5 INJECTION INTRAVENOUS at 09:07

## 2021-01-25 RX ADMIN — ATORVASTATIN CALCIUM 40 MG: 40 TABLET, FILM COATED ORAL at 09:07

## 2021-01-25 RX ADMIN — DILTIAZEM HYDROCHLORIDE 180 MG: 180 CAPSULE, COATED, EXTENDED RELEASE ORAL at 09:07

## 2021-01-25 RX ADMIN — GLIMEPIRIDE 2 MG: 2 TABLET ORAL at 09:07

## 2021-01-25 RX ADMIN — REMDESIVIR 200 MG: 100 INJECTION, POWDER, LYOPHILIZED, FOR SOLUTION INTRAVENOUS at 00:39

## 2021-01-25 RX ADMIN — ENOXAPARIN SODIUM 30 MG: 30 INJECTION SUBCUTANEOUS at 09:13

## 2021-01-25 RX ADMIN — INSULIN LISPRO 5 UNITS: 100 INJECTION, SOLUTION INTRAVENOUS; SUBCUTANEOUS at 09:11

## 2021-01-25 RX ADMIN — REMDESIVIR 100 MG: 100 INJECTION, POWDER, LYOPHILIZED, FOR SOLUTION INTRAVENOUS at 21:36

## 2021-01-25 RX ADMIN — SODIUM CHLORIDE, PRESERVATIVE FREE 10 ML: 5 INJECTION INTRAVENOUS at 21:35

## 2021-01-25 RX ADMIN — INSULIN GLARGINE 20 UNITS: 100 INJECTION, SOLUTION SUBCUTANEOUS at 21:34

## 2021-01-25 RX ADMIN — ENOXAPARIN SODIUM 30 MG: 30 INJECTION SUBCUTANEOUS at 21:34

## 2021-01-25 RX ADMIN — INSULIN LISPRO 12 UNITS: 100 INJECTION, SOLUTION INTRAVENOUS; SUBCUTANEOUS at 12:33

## 2021-01-25 ASSESSMENT — PAIN SCALES - GENERAL
PAINLEVEL_OUTOF10: 0
PAINLEVEL_OUTOF10: 0

## 2021-01-25 NOTE — PROGRESS NOTES
[address] [facility]  consulted by Dr. Demetrio Allen for monitoring and adjustment. Indication for treatment: Covid-19 pneumonia, possible superimposed bacterial infection  Goal trough: 15 mcg/mL (AUC/GLEN: 400-600)    Pertinent Laboratory Values:   Temp Readings from Last 3 Encounters:   01/24/21 98.2 °F (36.8 °C) (Oral)   01/24/21 97.5 °F (36.4 °C) (Oral)   01/09/21 98.8 °F (37.1 °C) (Oral)     Recent Labs     01/24/21  1115   WBC 13.1*     Recent Labs     01/24/21  1115   BUN 40*   CREATININE 1.4*     Estimated Creatinine Clearance: 49 mL/min (A) (based on SCr of 1.4 mg/dL (H)). Intake/Output Summary (Last 24 hours) at 1/25/2021 0133  Last data filed at 1/24/2021 2324  Gross per 24 hour   Intake 358.33 ml   Output 600 ml   Net -241.67 ml       Pertinent Cultures:  Date    Source    Results  1/09   Covid-19   Positive  1/24   Sputum   Ordered  1/24   Strep pneumo/Legionella Ordered    Vancomycin level:   TROUGH:  No results for input(s): VANCOTROUGH in the last 72 hours. RANDOM:  No results for input(s): VANCORANDOM in the last 72 hours. Assessment:  WBC and temperature: elevated WBC, afebrile  SCr, BUN, and urine output: DWAYNE, Scr = 1.4  Day(s) of therapy:1  Vancomycin concentration: To be collected    Plan:  Received vancomycin 1500 mg x 1 in the ED  Intermittent dosing based on levels due to DWAYNE  Pharmacy will continue to monitor patient and adjust therapy as indicated    Eugene 3 1/25/21 @2546    Thank you for the consult.   Shar Troncoso RPh   1/25/2021 1:33 AM

## 2021-01-25 NOTE — PLAN OF CARE
Problem: Airway Clearance - Ineffective  Goal: Achieve or maintain patent airway  1/25/2021 0813 by Ana Hernandez RN  Outcome: Ongoing  1/25/2021 0629 by Remington Eddy RN  Outcome: Ongoing     Problem: Gas Exchange - Impaired  Goal: Absence of hypoxia  1/25/2021 0813 by Ana Hernandez RN  Outcome: Ongoing  1/25/2021 0629 by Remington Eddy RN  Outcome: Ongoing  Goal: Promote optimal lung function  1/25/2021 0813 by Ana Hernandez RN  Outcome: Ongoing  1/25/2021 0629 by Remington Eddy RN  Outcome: Ongoing     Problem: Breathing Pattern - Ineffective  Goal: Ability to achieve and maintain a regular respiratory rate  1/25/2021 0813 by Ana Hernandez RN  Outcome: Ongoing  1/25/2021 0629 by Remington Logan RN  Outcome: Ongoing     Problem: Body Temperature -  Risk of, Imbalanced  Goal: Ability to maintain a body temperature within defined limits  1/25/2021 0813 by Ana Hernandez RN  Outcome: Ongoing  1/25/2021 0629 by Ernie Eddy RN  Outcome: Ongoing  Goal: Will regain or maintain usual level of consciousness  1/25/2021 0813 by Ana Hernandez RN  Outcome: Ongoing  1/25/2021 0629 by Remington Eddy RN  Outcome: Ongoing  Goal: Complications related to the disease process, condition or treatment will be avoided or minimized  1/25/2021 0813 by Ana Hernandez RN  Outcome: Ongoing  1/25/2021 0629 by Ernie Eddy RN  Outcome: Ongoing     Problem: Isolation Precautions - Risk of Spread of Infection  Goal: Prevent transmission of infection  1/25/2021 0813 by Ana Hernandez RN  Outcome: Ongoing  1/25/2021 0629 by Remington Eddy RN  Outcome: Ongoing     Problem: Nutrition Deficits  Goal: Optimize nutritional status  1/25/2021 0813 by Ana Hernandez RN  Outcome: Ongoing  1/25/2021 0629 by Remington Eddy RN  Outcome: Ongoing     Problem: Risk for Fluid Volume Deficit  Goal: Maintain normal heart rhythm  1/25/2021 0813 by Ana Hernandez, RN  Outcome: Ongoing  1/25/2021 0629 by Elena Eddy RN  Outcome: Ongoing  Goal: Maintain absence of muscle cramping  1/25/2021 0813 by Orly Dowell RN  Outcome: Ongoing  1/25/2021 0629 by Elena Eddy RN  Outcome: Ongoing  Goal: Maintain normal serum potassium, sodium, calcium, phosphorus, and pH  1/25/2021 0813 by Orly Dowell RN  Outcome: Ongoing  1/25/2021 0629 by Elena Eddy RN  Outcome: Ongoing     Problem: Loneliness or Risk for Loneliness  Goal: Demonstrate positive use of time alone when socialization is not possible  1/25/2021 0813 by Orly Dowell RN  Outcome: Ongoing  1/25/2021 0629 by Elena Barajas RN  Outcome: Ongoing     Problem: Patient Education: Go to Patient Education Activity  Goal: Patient/Family Education  1/25/2021 0813 by Orly Dowell RN  Outcome: Ongoing  1/25/2021 0629 by Elena Eddy RN  Outcome: Ongoing     Problem: Fatigue  Goal: Verbalize increase energy and improved vitality  1/25/2021 0813 by Orly Dowell RN  Outcome: Ongoing  1/25/2021 0629 by Elena Barajas RN  Outcome: Ongoing

## 2021-01-25 NOTE — CARE COORDINATION
Reviewed chart and attempted to call pt in room with no answer. I then called his wife Rosalba Owens, Discussed discharge needs/plans. Pt lives with wife, he recently got sent home from [facility]. On Home O2 through [address].  . He has no other DME. PTA he was independent with ADLs and transportation. Wife/family able to assist pt as needed. Pt's PCP is Dr Ryan Conner, and he has insurance that pays for medications. Discussed HC and she does not feel needed at this time. CM will continue to follow for needs.

## 2021-01-25 NOTE — H&P
Inability: Not on file    Transportation needs     Medical: Not on file     Non-medical: Not on file   Tobacco Use    Smoking status: Former Smoker    Smokeless tobacco: Never Used   Substance and Sexual Activity    Alcohol use: No    Drug use: No    Sexual activity: Not on file   Lifestyle    Physical activity     Days per week: Not on file     Minutes per session: Not on file    Stress: Not on file   Relationships    Social connections     Talks on phone: Not on file     Gets together: Not on file     Attends Judaism service: Not on file     Active member of club or organization: Not on file     Attends meetings of clubs or organizations: Not on file     Relationship status: Not on file    Intimate partner violence     Fear of current or ex partner: Not on file     Emotionally abused: Not on file     Physically abused: Not on file     Forced sexual activity: Not on file   Other Topics Concern    Not on file   Social History Narrative    Not on file       MEDICATIONS   Medications Prior to Admission  Medications Prior to Admission: atorvastatin (LIPITOR) 40 MG tablet, Take 40 mg by mouth daily  glimepiride (AMARYL) 2 MG tablet, Take 2 mg by mouth 2 times daily  dilTIAZem (TIAZAC) 180 MG extended release capsule, diltiazem  mg capsule,24 hr,extended release  1 by mouth every at bedtime  guaiFENesin-codeine (TUSSI-ORGANIDIN NR) 100-10 MG/5ML syrup, Take 5 mLs by mouth every 4 hours as needed. levoFLOXacin (LEVAQUIN) 500 MG tablet, Take 500 mg by mouth daily  famciclovir (FAMVIR) 500 MG tablet, famciclovir 500 mg tablet  metformin (GLUCOPHAGE) 500 MG tablet, Take 500 mg by mouth 3 times daily. quinapril-hydrochlorothiazide (ACCURETIC) 20-25 MG per tablet, Take 1 tablet by mouth daily. Current Medications  No current facility-administered medications for this encounter. Allergies  No Known Allergies    REVIEW OF SYSTEMS   Within above limitations.  14 point review of systems reviewed. Pertinent positive or negative as per HPI or otherwise negative per 14 point systems review. PHYSICAL EXAM     Wt Readings from Last 3 Encounters:   01/24/21 190 lb (86.2 kg)   01/09/21 195 lb (88.5 kg)   06/01/15 189 lb (85.7 kg)       Blood pressure 113/66, pulse 80, temperature 98.2 °F (36.8 °C), temperature source Oral, resp. rate 16, SpO2 96 %. General - AAO x 3  Psych - Appropriate affect/speech. No agitation  Eyes - Eye lids intact. No scleral icterus  ENT - Lips wnl. External ear clear/dry/intact. No thyromegaly on inspection  Neuro - No gross peripheral or central neuro deficits on inspection  Heart - Sinus. RRR. S1 and S2 present. No added HS/murmurs appreciated. No elevated JVD appreciated  Lung - Adequate air entry b/l, No crackles/wheezes appreciated  GI - Soft. No guarding/rigidity. No hepatosplenomegaly/ascites. BS+   - No CVA/suprapubic tenderness or palpable bladder distension  Skin - Intact. No rash/petechiae/ecchymosis. Warm extremities  MSK - Joints with normal ROM.  No joint swellings    Lines/Drains/Airways/Wounds:  [unfilled]    LABS AND IMAGING   CBC  [unfilled]    Last 3 Hemoglobin  Lab Results   Component Value Date    HGB 14.6 01/24/2021    HGB 15.4 01/09/2021     Last 3 WBC/ANC  Lab Results   Component Value Date    WBC 13.1 01/24/2021    WBC 6.0 01/09/2021     No components found for: GRNLOCTYABS  Last 3 Platelets  No results found for: PLATELET  Chemistry  [unfilled]  [unfilled]  No results found for: LDH  Coagulation Studies  No results found for: PTT, INR  Liver Function Studies  Lab Results   Component Value Date    ALT 45 01/24/2021    AST 27 01/24/2021    ALKPHOS 49 01/24/2021       Recent Imaging        Relevant labs and imaging reviewed    ASSESSMENT AND PLAN   Benjamin Valadez is a 65 y.o. male p/w      Cough and fever likely d/t COVID, with possible superimposed bacterial  - CTA noted  - cefepime and vanc, c/w if procal elevated and pending hospital course  -COVID-19 testing and related labs: CRP, d-dimer, ferritin, fibrinogen, LDH, lactate, procalcitonin, viral panel  - sputum cuture, , legionella antigen, strep pneumonia antigen  -Convalescent plasma tranfusion, patient provided verbal consent, risks and benefits explained, patient verbalized understanding  -Remdesivir  - decadron  - PRN O2 via NC  - pulmonary consult/ID if needed         DWAYNE possibly d/t dehyration  - avoid nephrotoxic medication  - IVF  - monitor I/Os  - consider nephrology consult    Hypertension  -Hold ACE inhibitor      Case d/w ED provider    DVT ppx: Lovenox  Code status: Full code    [facility], Internal Medicine  1/24/2021 at 9:31 PM

## 2021-01-25 NOTE — ED NOTES
Report given to [facility] at [facility], [place], operated by [facility], RN  01/24/21 2033

## 2021-01-25 NOTE — ED NOTES
Pt. Resting quietly , denies any pain or discomfort.  Call light w/ in reach     Luke Carter RN  01/24/21 9445

## 2021-01-25 NOTE — PROGRESS NOTES
Hospitalist Progress Note      Name:  Benjamin Valadez /Age/Sex: 1950  (65 y.o. male)   MRN & CSN:  [id number] & [id number] Admission Date/Time: 2021  9:09 PM   Location:  9454/8633- PCP: Shanta Pederson MD         Hospital Day: 2    ASSESSMENT/PLAN:  Benjamin Valadez is a 65 y.o.  male  who presented to the hospital with a complaint of shortness of breath and cough. Patient was diagnosed with COVID-19 on 2021. He was admitted to [facility] from 2021 and discharged. On 1/15/2021 with 2 L of oxygen. Per discharge summary at [facility], the patient was treated with remdesivir. However, it does not appear that the patient had a 5-day therapy. Additionally no mention of plasma on the discharge summary. CT chest demonstrated atypical/viral multifocal pneumonia. CT chest without evidence of pulmonary embolism. #.  Acute hypoxic respiratory failure #multifocal COVID-19 pneumonia---superimposed bacterial infection less likely. Procalcitonin low at 0.266. Treated with remdesivir on last hospitalization but unlikely completed 5-day therapy since he was discharged within 2 days of hospitalization. Temporarily treated with vancomycin and cefepime on this admission. Vancomycin and cefepime DC'd on .  -Solu-Medrol 40 IV daily  -Continue remdesivir  -No convalescent plasma, likely patient has a Covid IgG antibodies by now  -Covid IgG antibody    #. Acute kidney injury---baseline CR 0.9. CR admission 1.4.  -Daily chemistry panel  -Baseline serum contrast  -IVF at 75 cc  -Avoid ACE inhibitor/ARB    #. Remote tobacco abuse disorder---quit about 30 years ago. #.  Hypertension---BP controlled  -Hold quinapril and hydrochlorothiazide  -Continue diltiazem    #.   Type 2 diabetes---A1c 9.4 on 2021  -Lantus 15 units bedtime  -High-dose sliding scale insulin  -Hypoglycemia protocol    MEDICAL DECISION MAKING:  -Labs reviewed  -Imaging reviewed  -Level of risk high  Diet DIET CARB CONTROL; Carb Control: 4 carb choices (60 gms)/meal   DVT Prophylaxis [x] Lovenox, []  Heparin, [] SCDs, [] Ambulation   GI Prophylaxis [] PPI,  [] H2 Blocker,  [] Carafate,  [] Diet/Tube Feeds   Code Status Full Code   Disposition  Home   MDM [] Low, [] Moderate,[x]  High     Chief complaint/Interval History/ROS     Chief Complaint: Shortness of breath, cough    INTERVAL HISTORY:    1/24: Patient is currently on 6 to 7 L of oxygen. Appetite down. He is able to go to the bathroom on his own. ROS:  No chest pain. No abdominal pain. Objective: Intake/Output Summary (Last 24 hours) at 1/25/2021 6400  Last data filed at 1/24/2021 2324  Gross per 24 hour   Intake 358.33 ml   Output 600 ml   Net -241.67 ml      Vitals:   Vitals:    01/25/21 0811   BP:    Pulse: 61   Resp:    Temp:    SpO2:      Physical Exam:   GEN: Awake male, pleasant gentleman. Answers questions appropriate. Appears younger than stated age. EYES: Sclera anicteric. No conjunctival erythema. No eye discharge. HENT: Mucous membranes dry. No nasal discharge. NECK: Supple  RESP: Good air movement. No wheezing. No crackles. CV: RRR. No pitting lower extremity edema. GI: Abdomen soft. Nontender. Nondistended. : No Epstein in place. MSK: No bony fractures. No gross deformities. SKIN: warm, dry, no rashes, no pressure ulcer  NEURO: Cranial nerves appear grossly intact, normal speech, no lateralizing weakness.   PSYCH: Awake, alert, oriented    Medications:   Medications:    vancomycin (VANCOCIN) intermittent dosing (placeholder)   Other RX Placeholder    insulin lispro  0-3 Units Subcutaneous Nightly    atorvastatin  40 mg Oral Daily    dilTIAZem  180 mg Oral Daily    glimepiride  2 mg Oral BID WC    [Held by provider] quinapril-hydroCHLOROthiazide  1 tablet Oral Daily    cefepime  1,000 mg Intravenous Q8H    sodium chloride flush  10 mL Intravenous 2 times per day    enoxaparin  30 mg Subcutaneous BID    insulin lispro 0-6 Units Subcutaneous TID WC    dexamethasone  6 mg Oral Daily    remdesivir IVPB  100 mg Intravenous Q24H      Infusions:    sodium chloride 100 mL/hr at 01/24/21 2253    dextrose      sodium chloride       PRN Meds:     guaiFENesin-codeine, 5 mL, Q6H PRN      sodium chloride flush, 10 mL, PRN      promethazine, 12.5 mg, Q6H PRN    Or      ondansetron, 4 mg, Q6H PRN      polyethylene glycol, 17 g, Daily PRN      acetaminophen, 650 mg, Q6H PRN    Or      acetaminophen, 650 mg, Q6H PRN      glucose, 15 g, PRN      dextrose, 12.5 g, PRN      glucagon (rDNA), 1 mg, PRN      dextrose, 100 mL/hr, PRN      sodium chloride, , PRN      sodium chloride, 30 mL, PRN        Electronically signed by Michael Santoyo MD on 1/25/2021 at 9:22 AM

## 2021-01-25 NOTE — PROGRESS NOTES
Night Shift RN Notes:  Skin and Safety:  Complete two person skin assessment was performed by this RN and Nurse Monty Malik. No skin issues noted and no suspicious lesions found. Oriented to unit and updated of plan of care. Safety assured, call light and phone within reach. Side table with reach. Refused bed alarm. Education on safety provided. Questions answered. No s/s of distress and discomforts. Shift Concerns:  · Blood sugar (@2241) 500 informed Javon Howard regarding high blood sugar, received additional insulin order. Regular 5 units IV in addition to SSI  · Blood sugar (@0213) 418  informed FRANKI Santos received additional dose of SSI 3 units which she based of HS SSI order. · Specimen Collected:   Urine sample submitted    · Covid Convalescent Plasma: Followed up with [facility] of blood bank, plasma is not  available yet as of 0400. At 9158 Plasma is still not  available per [facility].

## 2021-01-26 LAB
ALBUMIN SERPL-MCNC: 2.8 GM/DL (ref 3.4–5)
ALP BLD-CCNC: 41 IU/L (ref 40–129)
ALT SERPL-CCNC: 35 U/L (ref 10–40)
ANION GAP SERPL CALCULATED.3IONS-SCNC: 9 MMOL/L (ref 4–16)
AST SERPL-CCNC: 19 IU/L (ref 15–37)
BILIRUB SERPL-MCNC: 0.8 MG/DL (ref 0–1)
BILIRUBIN DIRECT: 0.3 MG/DL (ref 0–0.3)
BILIRUBIN, INDIRECT: 0.5 MG/DL (ref 0–0.7)
BUN BLDV-MCNC: 41 MG/DL (ref 6–23)
CALCIUM SERPL-MCNC: 8.3 MG/DL (ref 8.3–10.6)
CHLORIDE BLD-SCNC: 101 MMOL/L (ref 99–110)
CO2: 25 MMOL/L (ref 21–32)
COMPONENT: NORMAL
CREAT SERPL-MCNC: 1.1 MG/DL (ref 0.9–1.3)
EKG ATRIAL RATE: 82 BPM
EKG DIAGNOSIS: NORMAL
EKG P AXIS: 24 DEGREES
EKG P-R INTERVAL: 154 MS
EKG Q-T INTERVAL: 360 MS
EKG QRS DURATION: 90 MS
EKG QTC CALCULATION (BAZETT): 420 MS
EKG R AXIS: -28 DEGREES
EKG T AXIS: 37 DEGREES
EKG VENTRICULAR RATE: 82 BPM
GFR AFRICAN AMERICAN: >60 ML/MIN/1.73M2
GFR NON-AFRICAN AMERICAN: >60 ML/MIN/1.73M2
GLUCOSE BLD-MCNC: 200 MG/DL (ref 70–99)
GLUCOSE BLD-MCNC: 220 MG/DL (ref 70–99)
GLUCOSE BLD-MCNC: 306 MG/DL (ref 70–99)
GLUCOSE BLD-MCNC: 331 MG/DL (ref 70–99)
GLUCOSE BLD-MCNC: 363 MG/DL (ref 70–99)
Lab: 2
POTASSIUM SERPL-SCNC: 5.1 MMOL/L (ref 3.5–5.1)
SODIUM BLD-SCNC: 135 MMOL/L (ref 135–145)
STATUS: NORMAL
STATUS: NORMAL
TOTAL PROTEIN: 5.4 GM/DL (ref 6.4–8.2)
TRANSFUSION STATUS: NORMAL
TRANSFUSION STATUS: NORMAL
UNIT DIVISION: NORMAL
UNIT DIVISION: NORMAL
UNIT NUMBER: NORMAL
UNIT NUMBER: NORMAL

## 2021-01-26 PROCEDURE — 1200000000 HC SEMI PRIVATE

## 2021-01-26 PROCEDURE — 6370000000 HC RX 637 (ALT 250 FOR IP): Performed by: INTERNAL MEDICINE

## 2021-01-26 PROCEDURE — 6360000002 HC RX W HCPCS: Performed by: INTERNAL MEDICINE

## 2021-01-26 PROCEDURE — 36415 COLL VENOUS BLD VENIPUNCTURE: CPT

## 2021-01-26 PROCEDURE — 82962 GLUCOSE BLOOD TEST: CPT

## 2021-01-26 PROCEDURE — 82728 ASSAY OF FERRITIN: CPT

## 2021-01-26 PROCEDURE — 2580000003 HC RX 258: Performed by: INTERNAL MEDICINE

## 2021-01-26 PROCEDURE — 80053 COMPREHEN METABOLIC PANEL: CPT

## 2021-01-26 PROCEDURE — 2500000003 HC RX 250 WO HCPCS: Performed by: INTERNAL MEDICINE

## 2021-01-26 PROCEDURE — 82248 BILIRUBIN DIRECT: CPT

## 2021-01-26 PROCEDURE — 93005 ELECTROCARDIOGRAM TRACING: CPT | Performed by: NURSE PRACTITIONER

## 2021-01-26 RX ORDER — FAMOTIDINE 20 MG/1
20 TABLET, FILM COATED ORAL 2 TIMES DAILY
Status: DISCONTINUED | OUTPATIENT
Start: 2021-01-26 | End: 2021-01-30 | Stop reason: HOSPADM

## 2021-01-26 RX ADMIN — METHYLPREDNISOLONE SODIUM SUCCINATE 40 MG: 40 INJECTION, POWDER, FOR SOLUTION INTRAMUSCULAR; INTRAVENOUS at 09:13

## 2021-01-26 RX ADMIN — DILTIAZEM HYDROCHLORIDE 180 MG: 180 CAPSULE, COATED, EXTENDED RELEASE ORAL at 09:13

## 2021-01-26 RX ADMIN — INSULIN LISPRO 12 UNITS: 100 INJECTION, SOLUTION INTRAVENOUS; SUBCUTANEOUS at 17:57

## 2021-01-26 RX ADMIN — FAMOTIDINE 20 MG: 20 TABLET, FILM COATED ORAL at 20:40

## 2021-01-26 RX ADMIN — INSULIN GLARGINE 20 UNITS: 100 INJECTION, SOLUTION SUBCUTANEOUS at 21:25

## 2021-01-26 RX ADMIN — ENOXAPARIN SODIUM 30 MG: 30 INJECTION SUBCUTANEOUS at 20:40

## 2021-01-26 RX ADMIN — REMDESIVIR 100 MG: 100 INJECTION, POWDER, LYOPHILIZED, FOR SOLUTION INTRAVENOUS at 21:27

## 2021-01-26 RX ADMIN — INSULIN LISPRO 15 UNITS: 100 INJECTION, SOLUTION INTRAVENOUS; SUBCUTANEOUS at 12:57

## 2021-01-26 RX ADMIN — INSULIN LISPRO 6 UNITS: 100 INJECTION, SOLUTION INTRAVENOUS; SUBCUTANEOUS at 09:11

## 2021-01-26 RX ADMIN — FAMOTIDINE 20 MG: 20 TABLET, FILM COATED ORAL at 15:02

## 2021-01-26 RX ADMIN — ENOXAPARIN SODIUM 30 MG: 30 INJECTION SUBCUTANEOUS at 09:13

## 2021-01-26 RX ADMIN — SODIUM CHLORIDE, PRESERVATIVE FREE 10 ML: 5 INJECTION INTRAVENOUS at 20:40

## 2021-01-26 RX ADMIN — ATORVASTATIN CALCIUM 40 MG: 40 TABLET, FILM COATED ORAL at 09:13

## 2021-01-26 NOTE — PROGRESS NOTES
Hospitalist Progress Note      Name:  Sierra Alvarado /Age/Sex: 1950  (65 y.o. male)   MRN & CSN:  [id number] & [id number] Admission Date/Time: 2021  9:09 PM   Location:  [id number]- PCP: Jeremy Moncada MD       Hospital Day: 3    Assessment and Plan:   Sierra Alvarado is a 65 y.o.  male  who presents with Acute respiratory failure with hypoxia ([facility] [address]    Acute Hypoxic Respiratory Failure:   · Due to COVID-19 pneumonia. · Requiring O2 via nasal cannula 6 L, saturation 93%. · CT Chest: Atypical/viral multifocal pneumonia. Cardiomegaly  · Continue inhalers. COVID 19 Pneumonia   Inflammatory markers elevated.  Started on cefepime and vancomycin now discontinued.  Continue Solu-Medrol daily.  Remdesivir day 3   Convalescent plasma 2 units    DVT prophylaxis    Acute Kidney Injury: Could be from COVID-19  · Creatinine now normal.   Avoid nephrotoxic agents   Nephrology on consult     Tobacco abuse disorder: Quit 30 years ago. Hypertension: Blood pressure controlled. Continue medications. Type 2 diabetes: On Lantus, sliding scale. Hypoglycemia protocol. Fatty liver    Hyperlipidemia: continue statin    Diet DIET CARB CONTROL; Carb Control: 4 carb choices (60 gms)/meal   DVT Prophylaxis [x] Lovenox, []  Heparin, [] SCDs, [] Warfarin  [] NOAC     GI Prophylaxis [] PPI,  [] H2 Blocker,  [] Carafate,  [] Diet/Tube Feeds   Code Status Full Code   MDM [] Low, [x] Moderate,[]  High     History of Present Illness:     Chief Complaint: Acute respiratory failure with hypoxia ([facility] [address]    Seen and examined today. SOb better. No cough    Ten point ROS reviewed negative, unless as noted above    Objective:        Intake/Output Summary (Last 24 hours) at 2021 1332  Last data filed at 2021 1334  Gross per 24 hour   Intake 245 ml   Output --   Net 245 ml      Vitals:   Vitals:    21 0753   BP: 112/61   Pulse: 56   Resp: 16   Temp: 97.6 °F (36.4 °C)   SpO2: 93%     Physical

## 2021-01-26 NOTE — CARE COORDINATION
Reviewed chart and pt remains up/independent in room. Plan remains home with wife. CM will continue to follow for possible needs.

## 2021-01-26 NOTE — CONSULTS
Subjective:   CHIEF COMPLAINT / HPI:  65year old male who was diagnosed with covid pneumonia initially on 1/9/21. He received two days of remdesivir and was dc home on home o2. For past 48 hours he is having increasing sob and occasional off and on wheeze.  He has cough which is mostly non productive      Past Medical History:  Past Medical History:   Diagnosis Date    COVID-19     Diabetes mellitus ([address]     Hyperlipidemia     Hypertension        Past Surgical History:        Procedure Laterality Date    COLONOSCOPY  06/01/2015    normal colonoscopy    ENDOSCOPY, COLON, DIAGNOSTIC      prepyloric uler    UPPER GASTROINTESTINAL ENDOSCOPY         Current Medications:    Current Facility-Administered Medications: methylPREDNISolone sodium (SOLU-MEDROL) injection 40 mg, 40 mg, Intravenous, Daily  insulin lispro (HUMALOG) injection vial 0-18 Units, 0-18 Units, Subcutaneous, TID WC  insulin lispro (HUMALOG) injection vial 0-9 Units, 0-9 Units, Subcutaneous, Nightly  insulin glargine (LANTUS) injection vial 20 Units, 20 Units, Subcutaneous, Nightly  atorvastatin (LIPITOR) tablet 40 mg, 40 mg, Oral, Daily  dilTIAZem (CARDIZEM CD) extended release capsule 180 mg, 180 mg, Oral, Daily  guaiFENesin-codeine (TUSSI-ORGANIDIN NR) 100-10 MG/5ML syrup 5 mL, 5 mL, Oral, Q6H PRN  sodium chloride flush 0.9 % injection 10 mL, 10 mL, Intravenous, 2 times per day  sodium chloride flush 0.9 % injection 10 mL, 10 mL, Intravenous, PRN  enoxaparin (LOVENOX) injection 30 mg, 30 mg, Subcutaneous, BID  promethazine (PHENERGAN) tablet 12.5 mg, 12.5 mg, Oral, Q6H PRN **OR** ondansetron (ZOFRAN) injection 4 mg, 4 mg, Intravenous, Q6H PRN  polyethylene glycol (GLYCOLAX) packet 17 g, 17 g, Oral, Daily PRN  acetaminophen (TYLENOL) tablet 650 mg, 650 mg, Oral, Q6H PRN **OR** acetaminophen (TYLENOL) suppository 650 mg, 650 mg, Rectal, Q6H PRN  0.9 % sodium chloride infusion, , Intravenous, Continuous  glucose (GLUTOSE) 40 % oral gel 15 g, 15 g, Oral, PRN  dextrose 50 % IV solution, 12.5 g, Intravenous, PRN  glucagon (rDNA) injection 1 mg, 1 mg, Intramuscular, PRN  dextrose 5 % solution, 100 mL/hr, Intravenous, PRN  0.9 % sodium chloride infusion, , Intravenous, PRN  [COMPLETED] remdesivir 200 mg in sodium chloride 0.9 % 250 mL IVPB, 200 mg, Intravenous, Once **FOLLOWED BY** remdesivir 100 mg in sodium chloride 0.9 % 250 mL IVPB, 100 mg, Intravenous, Q24H  0.9 % sodium chloride bolus, 30 mL, Intravenous, PRN    No Known Allergies    Social History:    Social History     Socioeconomic History    Marital status:      Spouse name: Not on file    Number of children: Not on file    Years of education: Not on file    Highest education level: Not on file   Occupational History    Not on file   Social Needs    Financial resource strain: Not on file    Food insecurity     Worry: Not on file     Inability: Not on file    Transportation needs     Medical: Not on file     Non-medical: Not on file   Tobacco Use    Smoking status: Former Smoker    Smokeless tobacco: Never Used   Substance and Sexual Activity    Alcohol use: No    Drug use: No    Sexual activity: Not on file   Lifestyle    Physical activity     Days per week: Not on file     Minutes per session: Not on file    Stress: Not on file   Relationships    Social connections     Talks on phone: Not on file     Gets together: Not on file     Attends Jew service: Not on file     Active member of club or organization: Not on file     Attends meetings of clubs or organizations: Not on file     Relationship status: Not on file    Intimate partner violence     Fear of current or ex partner: Not on file     Emotionally abused: Not on file     Physically abused: Not on file     Forced sexual activity: Not on file   Other Topics Concern    Not on file   Social History Narrative    Not on file       Family History:   No family history on file.     Immunization:    There is no immunization history on file for this patient. REVIEW OF SYSTEMS:    CONSTITUTIONAL:  negative for fevers, chills, diaphoresis, activity change, appetite change, fatigue, night sweats and unexpected weight change. EYES:  negative for blurred vision, eye discharge, visual disturbance and icterus  HEENT:  negative for hearing loss, tinnitus, ear drainage, sinus pressure, nasal congestion, epistaxis and snoring  RESPIRATORY:  See HPI  CARDIOVASCULAR:  negative for chest pain, palpitations, exertional chest pressure/discomfort, edema, syncope  GASTROINTESTINAL:  negative for nausea, vomiting, diarrhea, constipation, blood in stool and abdominal pain  GENITOURINARY:  negative for frequency, dysuria and hematuria  HEMATOLOGIC/LYMPHATIC:  negative for easy bruising, bleeding and lymphadenopathy  ALLERGIC/IMMUNOLOGIC:  negative for recurrent infections, angioedema, anaphylaxis and drug reactions  ENDOCRINE:  negative for weight changes and diabetic symptoms including polyuria, polydipsia and polyphagia    MUSCULOSKELETAL:  negative for  pain, joint swelling, decreased range of motion and muscle weakness  NEUROLOGICAL:  negative for headaches, slurred speech, unilateral weakness  PSYCHIATRIC/BEHAVIORAL: negative for hallucinations, behavioral problems, confusion and agitation. Objective:   PHYSICAL EXAM:      VITALS:    Vitals:    01/25/21 1518 01/25/21 2132 01/26/21 0412 01/26/21 0753   BP: 135/69 126/73 106/63 112/61   Pulse: 64 58  56   Resp: 22 20  16   Temp: 97.6 °F (36.4 °C) 97.7 °F (36.5 °C) 97.7 °F (36.5 °C) 97.6 °F (36.4 °C)   TempSrc: Oral Oral Oral Oral   SpO2: 97% 94% 90% 93%   Weight:       Height:             CONSTITUTIONAL:  awake, alert, cooperative, no apparent distress, and appears stated age  NECK:  Supple, symmetrical, trachea midline, no adenopathy, thyroid symmetric, not enlarged and no tenderness  CHEST: Chest expansion equal and symmetrical, no intercostal retraction.   LUNGS:  no increased work of breathing, has expiratory wheezes both lungs, no crackles. CARDIOVASCULAR: S1 and S2, no edema and no JVD  ABDOMEN:  normal bowel sounds, non-distended and no masses palpated, and no tenderness to palpation. No hepatospleenomegaly  LYMPHADENOPATHY:  no axillary or supraclavicular adenopathy. No cervical adnenopathy  PSYCHIATRIC: Oriented to person place and time. No obvious depression or anxiety. MUSCULOSKELETAL: No obvious misalignment or effusion of the joints. No clubbing, cyanosis of the digits. RIGHT AND LEFT LOWER EXTREMITIES: No edema, no inflammation, no tenderness. SKIN:  normal skin color, texture, turgor and no redness, warmth, or swelling. No palpable nodules    DATA:       EXAMINATION:   CTA OF THE CHEST 1/24/2021 1:44 pm       TECHNIQUE:   CTA of the chest was performed after the administration of intravenous   contrast.  Multiplanar reformatted images are provided for review.  MIP   images are provided for review. Dose modulation, iterative reconstruction,   and/or weight based adjustment of the mA/kV was utilized to reduce the   radiation dose to as low as reasonably achievable.       COMPARISON:   Portable chest from 01/09/2021       HISTORY:   ORDERING SYSTEM PROVIDED HISTORY: FERNANDA   TECHNOLOGIST PROVIDED HISTORY:   PE   Reason for exam:->FERNANDA   Acuity: Acute   Type of Exam: Initial   Additional signs and symptoms: (c/o increased SOB during the night,was   released from hospital 1/13/2021), covid +, iv contrast isovue 370 75ml in RT   AC @ 1402 hmj       78-year-old male with increased shortness of breath during the night; COVID   positive.       FINDINGS:   Exam is severely limited due to respiratory motion.       Pulmonary Arteries: No obvious filling defect in the right main, central   main, or left main pulmonary arteries.  Evaluation of the remainder of the   pulmonary arterial vasculature is severely limited to essentially non   diagnostic due to respiratory motion and airspace disease.  Segmental and   subsegmental pulmonary emboli difficult to exclude on the basis of this   limited study.       Mediastinum: Atherosclerotic calcification and atheromatous plaque of the   thoracic aorta and branch vasculature.  Ectasia of the ascending thoracic   aorta.  Cardiomegaly.  No pericardial or pleural effusions.  No axillary,   mediastinal, or hilar lymphadenopathy.  Visualized thyroid gland grossly   unremarkable in appearance.       Lungs/pleura: Trachea and proximal central airways appear patent.       Extensive respiratory motion.  Diffuse ground-glass opacities throughout both   lungs consistent with atypical multifocal pneumonia to include COVID.       Upper Abdomen: Fatty liver.  Atherosclerotic calcification of the upper   abdominal aorta.  Evaluation of the upper abdomen is limited due to   respiratory motion.       Soft Tissues/Bones: Mild diffuse degenerative changes throughout the spine.           Impression   1. Atypical/viral multifocal pneumonia consistent with patient's history of   COVID. 2. No central pulmonary embolus.  Evaluation of the remainder of the   pulmonary arterial vasculature is severely limited to essentially non   diagnostic due to respiratory motion airspace disease.  Segmental and   subsegmental pulmonary emboli difficult to exclude on the basis of this   limited study.  If there is persistent concern regarding segmental and   subsegmental pulmonary emboli, consider V/Q scan. 3. Cardiomegaly. Gayl Crisp of the ascending thoracic aorta. 4. Fatty liver. 5. Exam limited due to respiratory motion.                           abg 7.41/44/27      Assessment:      Ac hypoxemic resp fail;ure  covid pneumonia  Ac bronchospasm          Plan:     D/w pt  Adequate o2 adm  Vapotherm/bipap if needed  Bd rx  Agree with rmedesivir  On iv solumedrol  As procalcitonin is ok I doubt this is bacterial infection at present  Thanks will follow

## 2021-01-27 LAB
ALBUMIN SERPL-MCNC: 2.8 GM/DL (ref 3.4–5)
ALP BLD-CCNC: 43 IU/L (ref 40–129)
ALT SERPL-CCNC: 34 U/L (ref 10–40)
ANION GAP SERPL CALCULATED.3IONS-SCNC: 13 MMOL/L (ref 4–16)
AST SERPL-CCNC: 19 IU/L (ref 15–37)
BILIRUB SERPL-MCNC: 0.9 MG/DL (ref 0–1)
BILIRUBIN DIRECT: 0.2 MG/DL (ref 0–0.3)
BILIRUBIN, INDIRECT: 0.7 MG/DL (ref 0–0.7)
BUN BLDV-MCNC: 32 MG/DL (ref 6–23)
CALCIUM SERPL-MCNC: 8.5 MG/DL (ref 8.3–10.6)
CHLORIDE BLD-SCNC: 103 MMOL/L (ref 99–110)
CO2: 21 MMOL/L (ref 21–32)
CREAT SERPL-MCNC: 0.9 MG/DL (ref 0.9–1.3)
CULTURE: NORMAL
CULTURE: NORMAL
D DIMER: 768 NG/ML(DDU)
EKG ATRIAL RATE: 416 BPM
EKG ATRIAL RATE: 61 BPM
EKG DIAGNOSIS: NORMAL
EKG DIAGNOSIS: NORMAL
EKG Q-T INTERVAL: 428 MS
EKG Q-T INTERVAL: 432 MS
EKG QRS DURATION: 100 MS
EKG QRS DURATION: 94 MS
EKG QTC CALCULATION (BAZETT): 393 MS
EKG QTC CALCULATION (BAZETT): 423 MS
EKG R AXIS: -19 DEGREES
EKG R AXIS: 200 DEGREES
EKG T AXIS: -1 DEGREES
EKG T AXIS: 153 DEGREES
EKG VENTRICULAR RATE: 50 BPM
EKG VENTRICULAR RATE: 59 BPM
FERRITIN: 1293 NG/ML (ref 30–400)
GFR AFRICAN AMERICAN: >60 ML/MIN/1.73M2
GFR NON-AFRICAN AMERICAN: >60 ML/MIN/1.73M2
GLUCOSE BLD-MCNC: 129 MG/DL (ref 70–99)
GLUCOSE BLD-MCNC: 140 MG/DL (ref 70–99)
GLUCOSE BLD-MCNC: 225 MG/DL (ref 70–99)
GLUCOSE BLD-MCNC: 283 MG/DL (ref 70–99)
GLUCOSE BLD-MCNC: 356 MG/DL (ref 70–99)
GRAM SMEAR: NORMAL
HCT VFR BLD CALC: 38.3 % (ref 42–52)
HEMOGLOBIN: 12.7 GM/DL (ref 13.5–18)
Lab: NORMAL
Lab: NORMAL
MCH RBC QN AUTO: 27.6 PG (ref 27–31)
MCHC RBC AUTO-ENTMCNC: 33.2 % (ref 32–36)
MCV RBC AUTO: 83.3 FL (ref 78–100)
PDW BLD-RTO: 14.7 % (ref 11.7–14.9)
PLATELET # BLD: 229 K/CU MM (ref 140–440)
PMV BLD AUTO: 11.4 FL (ref 7.5–11.1)
POTASSIUM SERPL-SCNC: 4.4 MMOL/L (ref 3.5–5.1)
RBC # BLD: 4.6 M/CU MM (ref 4.6–6.2)
SARS-COV-2, IGG: POSITIVE
SODIUM BLD-SCNC: 137 MMOL/L (ref 135–145)
SPECIMEN: NORMAL
SPECIMEN: NORMAL
TOTAL PROTEIN: 5.7 GM/DL (ref 6.4–8.2)
TROPONIN T: <0.01 NG/ML
WBC # BLD: 14.2 K/CU MM (ref 4–10.5)

## 2021-01-27 PROCEDURE — 85379 FIBRIN DEGRADATION QUANT: CPT

## 2021-01-27 PROCEDURE — 2580000003 HC RX 258: Performed by: NURSE PRACTITIONER

## 2021-01-27 PROCEDURE — 6360000002 HC RX W HCPCS: Performed by: INTERNAL MEDICINE

## 2021-01-27 PROCEDURE — 6370000000 HC RX 637 (ALT 250 FOR IP): Performed by: INTERNAL MEDICINE

## 2021-01-27 PROCEDURE — 2700000000 HC OXYGEN THERAPY PER DAY

## 2021-01-27 PROCEDURE — 93005 ELECTROCARDIOGRAM TRACING: CPT | Performed by: NURSE PRACTITIONER

## 2021-01-27 PROCEDURE — 2500000003 HC RX 250 WO HCPCS: Performed by: INTERNAL MEDICINE

## 2021-01-27 PROCEDURE — 84484 ASSAY OF TROPONIN QUANT: CPT

## 2021-01-27 PROCEDURE — 82728 ASSAY OF FERRITIN: CPT

## 2021-01-27 PROCEDURE — 93010 ELECTROCARDIOGRAM REPORT: CPT | Performed by: INTERNAL MEDICINE

## 2021-01-27 PROCEDURE — 1200000000 HC SEMI PRIVATE

## 2021-01-27 PROCEDURE — 82962 GLUCOSE BLOOD TEST: CPT

## 2021-01-27 PROCEDURE — 94761 N-INVAS EAR/PLS OXIMETRY MLT: CPT

## 2021-01-27 PROCEDURE — 85027 COMPLETE CBC AUTOMATED: CPT

## 2021-01-27 PROCEDURE — 99222 1ST HOSP IP/OBS MODERATE 55: CPT | Performed by: INTERNAL MEDICINE

## 2021-01-27 PROCEDURE — 2580000003 HC RX 258: Performed by: INTERNAL MEDICINE

## 2021-01-27 PROCEDURE — 82248 BILIRUBIN DIRECT: CPT

## 2021-01-27 PROCEDURE — 2060000000 HC ICU INTERMEDIATE R&B

## 2021-01-27 PROCEDURE — 6360000002 HC RX W HCPCS: Performed by: NURSE PRACTITIONER

## 2021-01-27 PROCEDURE — 80053 COMPREHEN METABOLIC PANEL: CPT

## 2021-01-27 RX ADMIN — FAMOTIDINE 20 MG: 20 TABLET, FILM COATED ORAL at 08:01

## 2021-01-27 RX ADMIN — APIXABAN 5 MG: 5 TABLET, FILM COATED ORAL at 12:37

## 2021-01-27 RX ADMIN — INSULIN LISPRO 9 UNITS: 100 INJECTION, SOLUTION INTRAVENOUS; SUBCUTANEOUS at 17:15

## 2021-01-27 RX ADMIN — SODIUM CHLORIDE, PRESERVATIVE FREE 10 ML: 5 INJECTION INTRAVENOUS at 08:01

## 2021-01-27 RX ADMIN — ATORVASTATIN CALCIUM 40 MG: 40 TABLET, FILM COATED ORAL at 08:01

## 2021-01-27 RX ADMIN — APIXABAN 5 MG: 5 TABLET, FILM COATED ORAL at 23:12

## 2021-01-27 RX ADMIN — REMDESIVIR 100 MG: 100 INJECTION, POWDER, LYOPHILIZED, FOR SOLUTION INTRAVENOUS at 22:01

## 2021-01-27 RX ADMIN — SODIUM CHLORIDE, PRESERVATIVE FREE 10 ML: 5 INJECTION INTRAVENOUS at 21:45

## 2021-01-27 RX ADMIN — ENOXAPARIN SODIUM 30 MG: 30 INJECTION SUBCUTANEOUS at 08:01

## 2021-01-27 RX ADMIN — INSULIN LISPRO 6 UNITS: 100 INJECTION, SOLUTION INTRAVENOUS; SUBCUTANEOUS at 12:36

## 2021-01-27 RX ADMIN — METHYLPREDNISOLONE SODIUM SUCCINATE 40 MG: 40 INJECTION, POWDER, FOR SOLUTION INTRAMUSCULAR; INTRAVENOUS at 08:01

## 2021-01-27 RX ADMIN — FAMOTIDINE 20 MG: 20 TABLET, FILM COATED ORAL at 21:46

## 2021-01-27 RX ADMIN — AMIODARONE HYDROCHLORIDE 0.5 MG/MIN: 50 INJECTION, SOLUTION INTRAVENOUS at 01:48

## 2021-01-27 RX ADMIN — SODIUM CHLORIDE 30 ML: 9 INJECTION, SOLUTION INTRAVENOUS at 23:12

## 2021-01-27 RX ADMIN — INSULIN GLARGINE 20 UNITS: 100 INJECTION, SOLUTION SUBCUTANEOUS at 21:58

## 2021-01-27 ASSESSMENT — PAIN SCALES - GENERAL
PAINLEVEL_OUTOF10: 0

## 2021-01-27 NOTE — PLAN OF CARE
Problem: Airway Clearance - Ineffective  Goal: Achieve or maintain patent airway  Outcome: Ongoing     Problem: Gas Exchange - Impaired  Goal: Absence of hypoxia  Outcome: Ongoing  Goal: Promote optimal lung function  Outcome: Ongoing     Problem: Breathing Pattern - Ineffective  Goal: Ability to achieve and maintain a regular respiratory rate  Outcome: Ongoing     Problem:  Body Temperature -  Risk of, Imbalanced  Goal: Ability to maintain a body temperature within defined limits  Outcome: Ongoing  Goal: Will regain or maintain usual level of consciousness  Outcome: Ongoing  Goal: Complications related to the disease process, condition or treatment will be avoided or minimized  Outcome: Ongoing     Problem: Isolation Precautions - Risk of Spread of Infection  Goal: Prevent transmission of infection  Outcome: Ongoing     Problem: Nutrition Deficits  Goal: Optimize nutritional status  Outcome: Ongoing     Problem: Risk for Fluid Volume Deficit  Goal: Maintain normal heart rhythm  Outcome: Ongoing  Goal: Maintain absence of muscle cramping  Outcome: Ongoing  Goal: Maintain normal serum potassium, sodium, calcium, phosphorus, and pH  Outcome: Ongoing     Problem: Loneliness or Risk for Loneliness  Goal: Demonstrate positive use of time alone when socialization is not possible  Outcome: Ongoing     Problem: Fatigue  Goal: Verbalize increase energy and improved vitality  Outcome: Ongoing     Problem: Patient Education: Go to Patient Education Activity  Goal: Patient/Family Education  Outcome: Ongoing

## 2021-01-27 NOTE — PROGRESS NOTES
Francie Muñoz NP   Patient: Erin Ortiz   0'\"   1/26/21 11:23 PM   [phone] Hospital or Facility: [address] From: Neisha Hernandez RE: Sil Gale RM: [id number] Patient heart rate keeps dropping to 30 and is currently in afib. Patient just had a 3 second pause and per patient has never been seen by cardiologist in life. Need Callback: NO CALLBACK REQ  Read 11:24 PM   0'\"   1/26/21 11:27 PM   I did put orders in for cardiology consult and stat EKG. Will put parameters on cardizem as well.  Thank you

## 2021-01-27 NOTE — PROGRESS NOTES
pulmonary      SUBJECTIVE: transferred to icu     OBJECTIVE    VITALS:  /72   Pulse 50   Temp 98.6 °F (37 °C) (Oral)   Resp 19   Ht 5' 7\" (1.702 m)   Wt 177 lb 6.4 oz (80.5 kg)   SpO2 95%   BMI 27.78 kg/m²   HEAD AND FACE EXAM:  No throat injection, no active exudate,no thrush  NECK EXAM;No JVD, no masses, symmetrical  CHEST EXAM; Expansion equal and symmetrical, no masses  LUNG EXAM; Good breath sounds bilaterally. There are expiratory wheezes both lungs, there are crackles at both lung bases  CARDIOVASCULAR EXAM: Positive S1 and S2, no S3 or S4, no clicks ,no murmurs  RIGHT AND LEFT LOWER EXTRIMITY EXAM: No edema, no swelling, no inflamation  CNS EXAM: Alert and oriented X3          LABS   Lab Results   Component Value Date    WBC 14.2 (H) 01/27/2021    HGB 12.7 (L) 01/27/2021    HCT 38.3 (L) 01/27/2021    MCV 83.3 01/27/2021     01/27/2021     Lab Results   Component Value Date    CREATININE 1.1 01/26/2021    BUN 41 (H) 01/26/2021     01/26/2021    K 5.1 01/26/2021     01/26/2021    CO2 25 01/26/2021     Lab Results   Component Value Date    INR 1.05 01/25/2021    PROTIME 12.7 01/25/2021        No results found for: PHOS   No results for input(s): PH, PO2ART, CFL9GFC, HCO3, BEART, O2SAT in the last 72 hours. Wt Readings from Last 3 Encounters:   01/24/21 177 lb 6.4 oz (80.5 kg)   01/24/21 190 lb (86.2 kg)   01/09/21 195 lb (88.5 kg)               ASSESMENT  Ac resp failure  covid pneumonia  Ac bronchospasm        PLAN  1. Bd rx  2. cpm  3. Cont o2  4.  Cardiac issue rx as per cardiology    1/27/2021  Angelina Kingston M.D.

## 2021-01-27 NOTE — PROGRESS NOTES
Call initiated by: Nursing staff:  Yordan Sánchez  Call addressed around: 1/27/2021 1:10 AM  Reason for call: EKG changes.  Patient having arrhthymias with HR fluctuating between 30-50  Orders placed:  -Start EKG and consult cardiology  -Low dose Amiodarone drip per cardio  -Serial troponin  -BMP with mag  -Transfer patient    LILLIE Blackman - CNP

## 2021-01-27 NOTE — CONSULTS
INPATIENT CARDIOLOGY CONSULT NOTE       Reason for consultation: New onset A. Fib    Referring physician:  Winferd Sandhoff, MD     Primary care physician: Isabel Neri MD      Dear Winferd Sandhoff, MD Thank you for the consult      History of present illness:Nir is a 65 y. o.year old who  presents with hypoxia related to Covid pneumonia. Patient was recently discharged from an outside facility with Covid pneumonia. He presents back to the hospital with shortness of breath.  patient is currently admitted in the ICU getting treatment for COVID-19. Cardiology is consulted to evaluate patient for atrial fibrillation which is of new onset. Patient denies any prior history of CAD congestive heart failure or arrhythmias  Denies any palpitations    Telemetry shows atrial fibrillation rate controlled and 50s to 60s beats per minute. Patient also noted to have bradycardia on telemetry, A. fib with slow ventricular response. His chief complaint is shortness of breath currently. EKG shows atrial fibrillation at 55 bpm    ECHO        Summary   Expedited imaging was used to obtain protocol images to reduce the   sonographer's exposure during COVID-19 pandemic. Left ventricular systolic function is normal.   Ejection fraction is visually estimated at 50-55%. No significant valvular abnormalities. No evidence of any pericardial effusion. Dilation of the aortic root. Past medical history:    has a past medical history of COVID-19, Diabetes mellitus ([address] Hyperlipidemia, and Hypertension. Past surgical history:   has a past surgical history that includes Endoscopy, colon, diagnostic; Upper gastrointestinal endoscopy; and Colonoscopy (06/01/2015). Social History:   reports that he has quit smoking. He has never used smokeless tobacco. He reports that he does not drink alcohol or use drugs.   Family history:   no family history of CAD, STROKE of DM    No Known Allergies       amiodarone (CORDARONE) 450 mg in dextrose 5 % 250 mL infusion, Continuous      famotidine (PEPCID) tablet 20 mg, BID      insulin lispro (HUMALOG) injection vial 5 Units, TID WC      methylPREDNISolone sodium (SOLU-MEDROL) injection 40 mg, Daily      insulin lispro (HUMALOG) injection vial 0-18 Units, TID WC      insulin lispro (HUMALOG) injection vial 0-9 Units, Nightly      insulin glargine (LANTUS) injection vial 20 Units, Nightly      atorvastatin (LIPITOR) tablet 40 mg, Daily      [Held by provider] dilTIAZem (CARDIZEM CD) extended release capsule 180 mg, Daily      guaiFENesin-codeine (TUSSI-ORGANIDIN NR) 100-10 MG/5ML syrup 5 mL, Q6H PRN      sodium chloride flush 0.9 % injection 10 mL, 2 times per day      sodium chloride flush 0.9 % injection 10 mL, PRN      enoxaparin (LOVENOX) injection 30 mg, BID      promethazine (PHENERGAN) tablet 12.5 mg, Q6H PRN    Or      ondansetron (ZOFRAN) injection 4 mg, Q6H PRN      polyethylene glycol (GLYCOLAX) packet 17 g, Daily PRN      acetaminophen (TYLENOL) tablet 650 mg, Q6H PRN    Or      acetaminophen (TYLENOL) suppository 650 mg, Q6H PRN      glucose (GLUTOSE) 40 % oral gel 15 g, PRN      dextrose 50 % IV solution, PRN      glucagon (rDNA) injection 1 mg, PRN      dextrose 5 % solution, PRN      0.9 % sodium chloride infusion, PRN      remdesivir 100 mg in sodium chloride 0.9 % 250 mL IVPB, Q24H      0.9 % sodium chloride bolus, PRN      Current Facility-Administered Medications   Medication Dose Route Frequency Provider Last Rate Last Admin    amiodarone (CORDARONE) 450 mg in dextrose 5 % 250 mL infusion  0.5 mg/min Intravenous Continuous LILLIE Salazar - CNP 16.7 mL/hr at 01/27/21 0148 0.5 mg/min at 01/27/21 0148    famotidine (PEPCID) tablet 20 mg  20 mg Oral BID Veronika Vega MD   20 mg at 01/27/21 0801    insulin lispro (HUMALOG) injection vial 5 Units  5 Units Subcutaneous TID  Veronika Vega MD   5 Units at 01/26/21 1757    methylPREDNISolone sodium (SOLU-MEDROL) injection 40 mg  40 mg Intravenous Daily Zahraa Allison MD   40 mg at 01/27/21 0801    insulin lispro (HUMALOG) injection vial 0-18 Units  0-18 Units Subcutaneous TID WC Zahraa Allison MD   12 Units at 01/26/21 1757    insulin lispro (HUMALOG) injection vial 0-9 Units  0-9 Units Subcutaneous Nightly Zahraa Allison MD   6 Units at 01/26/21 2040    insulin glargine (LANTUS) injection vial 20 Units  20 Units Subcutaneous Nightly Zahraa Allison MD   20 Units at 01/26/21 2125    atorvastatin (LIPITOR) tablet 40 mg  40 mg Oral Daily Omid ESPITIA MD   40 mg at 01/27/21 0801    [Held by provider] dilTIAZem (CARDIZEM CD) extended release capsule 180 mg  180 mg Oral Daily LILLIE Richards - CNP   Stopped at 01/27/21 0900    guaiFENesin-codeine (TUSSI-ORGANIDIN NR) 100-10 MG/5ML syrup 5 mL  5 mL Oral Q6H PRN Clark Duke Spaulding MD        sodium chloride flush 0.9 % injection 10 mL  10 mL Intravenous 2 times per day Miguel Angel Lin MD   10 mL at 01/27/21 0801    sodium chloride flush 0.9 % injection 10 mL  10 mL Intravenous PRN Clark Duke Spaulding MD        enoxaparin (LOVENOX) injection 30 mg  30 mg Subcutaneous BID Clark Duke Spaulding MD   30 mg at 01/27/21 0801    promethazine (PHENERGAN) tablet 12.5 mg  12.5 mg Oral Q6H PRN Clark Duke Spaulding MD        Or    ondansetron (ZOFRAN) injection 4 mg  4 mg Intravenous Q6H PRN Clark Duke Spaulding MD        polyethylene glycol (GLYCOLAX) packet 17 g  17 g Oral Daily PRN Clark Duke Spaulding MD        acetaminophen (TYLENOL) tablet 650 mg  650 mg Oral Q6H PRN Clark Duke Spaulding MD        Or    acetaminophen (TYLENOL) suppository 650 mg  650 mg Rectal Q6H PRN Clark Duke Spaulding MD        glucose (GLUTOSE) 40 % oral gel 15 g  15 g Oral PRN Clark Duke Spaulding MD        dextrose 50 % IV solution  12.5 g Intravenous PRN Clark Spaulding MD        glucagon (rDNA) injection 1 mg  1 mg Intramuscular PRN Clark Spaulding MD        dextrose 5 % solution  100 mL/hr Intravenous PRN Clark Estrada MD        0.9 % sodium chloride infusion   Intravenous PRN Clark Estrada MD        remdesivir 100 mg in sodium chloride 0.9 % 250 mL IVPB  100 mg Intravenous Q24H Clark Estrada MD   Stopped at 01/26/21 2157    0.9 % sodium chloride bolus  30 mL Intravenous PRN Clark Estrada MD             Review of Systems:     · Constitutional: No Fever or Weight Loss   · Eyes: No Decreased Vision  · ENT: No Headaches, Hearing Loss or Vertigo  · Cardiovascular: No chest pain, dyspnea on exertion, palpitations or loss of consciousness  · Respiratory: No cough or wheezing    · Gastrointestinal: No abdominal pain, appetite loss, blood in stools, constipation, diarrhea or heartburn  · Genitourinary: No dysuria, trouble voiding, or hematuria  · Musculoskeletal:  No gait disturbance, weakness or joint complaints  · Integumentary: No rash or pruritis  · Neurological: No TIA or stroke symptoms  · Psychiatric: No anxiety or depression  · Endocrine: No malaise, fatigue or temperature intolerance  · Hematologic/Lymphatic: No bleeding problems, blood clots or swollen lymph nodes  · Allergic/Immunologic: No nasal congestion or hives    All other systems were reviewed and were negative otherwise. Physical Examination:      Vitals:    01/27/21 0829   BP:    Pulse:    Resp:    Temp:    SpO2: 91%      Wt Readings from Last 3 Encounters:   01/24/21 177 lb 6.4 oz (80.5 kg)   01/24/21 190 lb (86.2 kg)   01/09/21 195 lb (88.5 kg)     Body mass index is 27.78 kg/m². General Appearance:  No distress, conversant  Constitutional:  Well developed, Well nourished  HEENT:  Normocephalic, Atraumatic, Oropharynx moist, No oral exudates,   Nose normal. Neck Supple Carotid: no carotid bruit  Eyes:  Conjunctiva normal, No discharge.    Respiratory:    Normal breath sounds, No respiratory distress, No wheezing, no use of accessory muscles, diaphragm movement is normal  No chest Tenderness  Cardiovascular: S1-S2 No murmurs auscultated. No rubs, thrills or gallops. IRIR rhythm. Pedal pulses are normal. No pedal edema  GI:  Soft Non tender, non distended. :  No CVA tenderness. Musculoskeletal:   No tenderness, No cyanosis, No clubbing. Integument:  Warm, Dry, No erythema, No rash. Lymphatic:  No lymphadenopathy noted. Neurologic:  Alert & oriented x 3  No focal deficits noted. Psychiatric:  Affect normal, Judgment normal, Mood normal.       Lab Review     Recent Labs     01/27/21  0600   WBC 14.2*   HGB 12.7*   HCT 38.3*         Recent Labs     01/27/21  0600      K 4.4      CO2 21   BUN 32*   CREATININE 0.9     Recent Labs     01/27/21  0600   AST 19   ALT 34   BILIDIR 0.2   BILITOT 0.9   ALKPHOS 43     No results for input(s): TROPONINI in the last 72 hours. No results found for: BNP  Lab Results   Component Value Date    INR 1.05 01/25/2021    PROTIME 12.7 01/25/2021         All labs, images, EKGs were personally reviewed      Assessment: 65 y. o.year old with PMH of  has a past medical history of COVID-19, Diabetes mellitus ([address] Hyperlipidemia, and Hypertension. Recommendations:      1. New onset atrial fibrillation in the setting of COVID-19 pneumonia. His chads vas score is 2. Start patient on Eliquis. Rate is currently controlled will avoid any AV troy blocking agents. Outpatient follow-up with Dr. Rosalba Jerome, will schedule for LISA guided cardioversion as outpatient once fully recovered from COVID-19  2. Bradycardia, A. fib with slow ventricular response. Stop Cardizem 180 mg daily. Stop amiodarone. 3. COVID-19: Treatment as per ICU team.  Patient is on remdesivir and steroids. 4. Insulin-dependent diabetes mellitus: Continue with insulin  5.  Hyperlipidemia: Continue with Lipitor 40 mg daily    Thank you for the consult    Dr. Ronald March  1/27/2021 10:31 AM

## 2021-01-27 NOTE — PROGRESS NOTES
Patient arrived to unit per bed, on amio drip at 16.7mL/h, patient alert and oriented, VS stable, patient transferred to unit bed independently. Skin assessment done, no open areas noted. Patient oriented to room, call light, staff.

## 2021-01-27 NOTE — PROGRESS NOTES
Soraida Sender   Patient: Justina Rider   0'\"   1/27/21 12:23 AM   [phone] From: [address] RE: Faraz Antunez Patient is in a fib currently and that is a change from yesterday. Ekg stat complete and changes noted on ekg from 2 days ago when compared.  Patient states has never been seen by cardiologist.  [place] 12:24 AM

## 2021-01-27 NOTE — PROGRESS NOTES
[address]    Hospitalist Progress Note      Name:  Tera Grove /Age/Sex: 1950  (65 y.o. male)   MRN & CSN:  [id number] & [id number] Admission Date/Time: 2021  9:09 PM   Location:  -A PCP: Damien Lawson MD         Hospital Day: 4    Assessment and Plan:   Tera Grove is a 65 y.o.  male  Who presented with complaint of shortness of breath and cough. Patient was diagnosed with COVID-19 on 2021. He was admitted to [facility] from 2021 and discharged. On 1/15/2021 with 2 L of oxygen. Acute hypoxic respiratory failure due to COVID-19 pneumonia    SARS-COV2 detected on 21 at an OSH - no positive for COVID IgG  Requiring oxygen supplement,  Procalcitonin low at 0.266   No convalescent plasma (positive for COVID IgG)  Continue with Solu-Medrol, Remdesivir end date     Paroxysmal AFIB - rate controled     Initially bradycardic -HR in the range of 53-60   Continue with Eliquis, Amiodarone drip DC per Cardiology     Acute kidney injury - resolved     Baseline CR 0.9. CR admission 1.4..  0.9   Avoid ACE inhibitor/ARB, DC IVF    DM type II -A1c 9.4, continue with Lantus and insulin sliding scale    Chronic medical conditions: Resume home medications as appropriate unless contraindicated     Hypertension   Hyperlipidemia    Diet DIET CARB CONTROL; Carb Control: 4 carb choices (60 gms)/meal   DVT Prophylaxis [] Lovenox, []  Heparin, [] SCDs, []No VTE prophylaxis, patient ambulating   GI Prophylaxis [] PPI, [] H2 Blocker, [] No GI prophylaxis, patient is receiving diet/Tube Feeds   Code Status Full Code   Disposition Patient requires continued admission due to respiratory failure   MDM [] Low, [x] Moderate,[]  High     History of Present Illness: Subjective     Patient Seen & Examined at the bedside      Patient is resting in bed with no distress while on nasal cannula  Has some shortness of breath but improving  Denies any chest pain    Ten point ROS reviewed negative, unless as noted above    Objective: Intake/Output Summary (Last 24 hours) at 1/27/2021 0900  Last data filed at 1/27/2021 0801  Gross per 24 hour   Intake 10 ml   Output --   Net 10 ml      Vitals:   Vitals:    01/27/21 0829   BP:    Pulse:    Resp:    Temp:    SpO2: 91%     Physical Exam:    GEN Awake male, resting in bed in no apparent distress. Appears given age. RESP diffuse rhonchi breath no wheezes, rales    CARDIO/VASC -S1/S2 auscultated. Regular rate without appreciable murmurs, rubs, or gallops. Peripheral pulses equal bilaterally and palpable. No peripheral edema. GI Abdomen is soft without significant tenderness, masses, or guarding. Bowel sounds are normoactive. Rectal exam deferred.  Epstein catheter is not present.     Medications:   Medications:    famotidine  20 mg Oral BID    insulin lispro  5 Units Subcutaneous TID WC    methylPREDNISolone  40 mg Intravenous Daily    insulin lispro  0-18 Units Subcutaneous TID WC    insulin lispro  0-9 Units Subcutaneous Nightly    insulin glargine  20 Units Subcutaneous Nightly    atorvastatin  40 mg Oral Daily    [Held by provider] dilTIAZem  180 mg Oral Daily    sodium chloride flush  10 mL Intravenous 2 times per day    enoxaparin  30 mg Subcutaneous BID    remdesivir IVPB  100 mg Intravenous Q24H      Infusions:    amiodarone 450mg/250ml D5W infusion 0.5 mg/min (01/27/21 0148)    dextrose      sodium chloride       PRN Meds:     guaiFENesin-codeine, 5 mL, Q6H PRN      sodium chloride flush, 10 mL, PRN      promethazine, 12.5 mg, Q6H PRN    Or      ondansetron, 4 mg, Q6H PRN      polyethylene glycol, 17 g, Daily PRN      acetaminophen, 650 mg, Q6H PRN    Or      acetaminophen, 650 mg, Q6H PRN      glucose, 15 g, PRN      dextrose, 12.5 g, PRN      glucagon (rDNA), 1 mg, PRN      dextrose, 100 mL/hr, PRN      sodium chloride, , PRN      sodium chloride, 30 mL, PRN          Electronically signed by Yary Stout MD on 1/27/2021 at 9:00 AM

## 2021-01-28 LAB
ALBUMIN SERPL-MCNC: 2.9 GM/DL (ref 3.4–5)
ALP BLD-CCNC: 38 IU/L (ref 40–129)
ALT SERPL-CCNC: 44 U/L (ref 10–40)
ANION GAP SERPL CALCULATED.3IONS-SCNC: 9 MMOL/L (ref 4–16)
AST SERPL-CCNC: 27 IU/L (ref 15–37)
BILIRUB SERPL-MCNC: 0.9 MG/DL (ref 0–1)
BILIRUBIN DIRECT: 0.2 MG/DL (ref 0–0.3)
BILIRUBIN, INDIRECT: 0.7 MG/DL (ref 0–0.7)
BUN BLDV-MCNC: 30 MG/DL (ref 6–23)
CALCIUM SERPL-MCNC: 8.7 MG/DL (ref 8.3–10.6)
CHLORIDE BLD-SCNC: 104 MMOL/L (ref 99–110)
CO2: 22 MMOL/L (ref 21–32)
CREAT SERPL-MCNC: 0.9 MG/DL (ref 0.9–1.3)
D DIMER: 727 NG/ML(DDU)
FERRITIN: 1088 NG/ML (ref 30–400)
GFR AFRICAN AMERICAN: >60 ML/MIN/1.73M2
GFR NON-AFRICAN AMERICAN: >60 ML/MIN/1.73M2
GLUCOSE BLD-MCNC: 100 MG/DL (ref 70–99)
GLUCOSE BLD-MCNC: 126 MG/DL (ref 70–99)
GLUCOSE BLD-MCNC: 243 MG/DL (ref 70–99)
GLUCOSE BLD-MCNC: 248 MG/DL (ref 70–99)
GLUCOSE BLD-MCNC: 250 MG/DL (ref 70–99)
POTASSIUM SERPL-SCNC: 4.5 MMOL/L (ref 3.5–5.1)
SODIUM BLD-SCNC: 135 MMOL/L (ref 135–145)
TOTAL PROTEIN: 5.2 GM/DL (ref 6.4–8.2)

## 2021-01-28 PROCEDURE — 6360000002 HC RX W HCPCS: Performed by: INTERNAL MEDICINE

## 2021-01-28 PROCEDURE — 82728 ASSAY OF FERRITIN: CPT

## 2021-01-28 PROCEDURE — 82962 GLUCOSE BLOOD TEST: CPT

## 2021-01-28 PROCEDURE — 2580000003 HC RX 258: Performed by: INTERNAL MEDICINE

## 2021-01-28 PROCEDURE — 82248 BILIRUBIN DIRECT: CPT

## 2021-01-28 PROCEDURE — 1200000000 HC SEMI PRIVATE

## 2021-01-28 PROCEDURE — 85379 FIBRIN DEGRADATION QUANT: CPT

## 2021-01-28 PROCEDURE — 6370000000 HC RX 637 (ALT 250 FOR IP): Performed by: INTERNAL MEDICINE

## 2021-01-28 PROCEDURE — 99233 SBSQ HOSP IP/OBS HIGH 50: CPT | Performed by: INTERNAL MEDICINE

## 2021-01-28 PROCEDURE — 2500000003 HC RX 250 WO HCPCS: Performed by: INTERNAL MEDICINE

## 2021-01-28 PROCEDURE — 94761 N-INVAS EAR/PLS OXIMETRY MLT: CPT

## 2021-01-28 PROCEDURE — 80053 COMPREHEN METABOLIC PANEL: CPT

## 2021-01-28 PROCEDURE — 2700000000 HC OXYGEN THERAPY PER DAY

## 2021-01-28 RX ADMIN — FAMOTIDINE 20 MG: 20 TABLET, FILM COATED ORAL at 21:37

## 2021-01-28 RX ADMIN — INSULIN GLARGINE 20 UNITS: 100 INJECTION, SOLUTION SUBCUTANEOUS at 21:37

## 2021-01-28 RX ADMIN — INSULIN LISPRO 6 UNITS: 100 INJECTION, SOLUTION INTRAVENOUS; SUBCUTANEOUS at 16:54

## 2021-01-28 RX ADMIN — SODIUM CHLORIDE, PRESERVATIVE FREE 10 ML: 5 INJECTION INTRAVENOUS at 21:37

## 2021-01-28 RX ADMIN — SODIUM CHLORIDE, PRESERVATIVE FREE 10 ML: 5 INJECTION INTRAVENOUS at 08:25

## 2021-01-28 RX ADMIN — APIXABAN 5 MG: 5 TABLET, FILM COATED ORAL at 08:25

## 2021-01-28 RX ADMIN — REMDESIVIR 100 MG: 100 INJECTION, POWDER, LYOPHILIZED, FOR SOLUTION INTRAVENOUS at 22:28

## 2021-01-28 RX ADMIN — APIXABAN 5 MG: 5 TABLET, FILM COATED ORAL at 21:37

## 2021-01-28 RX ADMIN — ATORVASTATIN CALCIUM 40 MG: 40 TABLET, FILM COATED ORAL at 08:25

## 2021-01-28 RX ADMIN — INSULIN LISPRO 9 UNITS: 100 INJECTION, SOLUTION INTRAVENOUS; SUBCUTANEOUS at 12:05

## 2021-01-28 RX ADMIN — METHYLPREDNISOLONE SODIUM SUCCINATE 40 MG: 40 INJECTION, POWDER, FOR SOLUTION INTRAMUSCULAR; INTRAVENOUS at 08:25

## 2021-01-28 RX ADMIN — FAMOTIDINE 20 MG: 20 TABLET, FILM COATED ORAL at 08:25

## 2021-01-28 ASSESSMENT — PAIN SCALES - GENERAL
PAINLEVEL_OUTOF10: 0

## 2021-01-28 NOTE — PLAN OF CARE
Problem: Airway Clearance - Ineffective  Goal: Achieve or maintain patent airway  Outcome: Met This Shift     Problem: Gas Exchange - Impaired  Goal: Absence of hypoxia  Outcome: Met This Shift  Goal: Promote optimal lung function  Outcome: Met This Shift     Problem: Breathing Pattern - Ineffective  Goal: Ability to achieve and maintain a regular respiratory rate  Outcome: Met This Shift     Problem:  Body Temperature -  Risk of, Imbalanced  Goal: Ability to maintain a body temperature within defined limits  Outcome: Met This Shift  Goal: Will regain or maintain usual level of consciousness  Outcome: Met This Shift  Goal: Complications related to the disease process, condition or treatment will be avoided or minimized  Outcome: Met This Shift     Problem: Isolation Precautions - Risk of Spread of Infection  Goal: Prevent transmission of infection  Outcome: Met This Shift     Problem: Nutrition Deficits  Goal: Optimize nutritional status  Outcome: Met This Shift     Problem: Risk for Fluid Volume Deficit  Goal: Maintain normal heart rhythm  Outcome: Met This Shift  Goal: Maintain absence of muscle cramping  Outcome: Met This Shift  Goal: Maintain normal serum potassium, sodium, calcium, phosphorus, and pH  Outcome: Met This Shift     Problem: Loneliness or Risk for Loneliness  Goal: Demonstrate positive use of time alone when socialization is not possible  Outcome: Met This Shift     Problem: Fatigue  Goal: Verbalize increase energy and improved vitality  Outcome: Met This Shift     Problem: Patient Education: Go to Patient Education Activity  Goal: Patient/Family Education  Outcome: Met This Shift     Problem: Fluid Volume:  Goal: Ability to maintain a balanced intake and output will improve  Description: Ability to maintain a balanced intake and output will improve  Outcome: Met This Shift     Problem: Metabolic:  Goal: Ability to maintain appropriate glucose levels will improve  Description: Ability to maintain appropriate glucose levels will improve  Outcome: Met This Shift     Problem: Nutritional:  Goal: Maintenance of adequate nutrition will improve  Description: Maintenance of adequate nutrition will improve  Outcome: Met This Shift

## 2021-01-28 NOTE — PLAN OF CARE
Problem: Airway Clearance - Ineffective  Goal: Achieve or maintain patent airway  Outcome: Ongoing     Problem: Gas Exchange - Impaired  Goal: Absence of hypoxia  Outcome: Ongoing  Goal: Promote optimal lung function  Outcome: Ongoing     Problem: Breathing Pattern - Ineffective  Goal: Ability to achieve and maintain a regular respiratory rate  Outcome: Ongoing     Problem:  Body Temperature -  Risk of, Imbalanced  Goal: Ability to maintain a body temperature within defined limits  Outcome: Ongoing  Goal: Will regain or maintain usual level of consciousness  Outcome: Ongoing  Goal: Complications related to the disease process, condition or treatment will be avoided or minimized  Outcome: Ongoing     Problem: Isolation Precautions - Risk of Spread of Infection  Goal: Prevent transmission of infection  Outcome: Ongoing     Problem: Nutrition Deficits  Goal: Optimize nutritional status  Outcome: Ongoing     Problem: Risk for Fluid Volume Deficit  Goal: Maintain normal heart rhythm  Outcome: Ongoing  Goal: Maintain absence of muscle cramping  Outcome: Ongoing  Goal: Maintain normal serum potassium, sodium, calcium, phosphorus, and pH  Outcome: Ongoing     Problem: Loneliness or Risk for Loneliness  Goal: Demonstrate positive use of time alone when socialization is not possible  Outcome: Ongoing     Problem: Fatigue  Goal: Verbalize increase energy and improved vitality  Outcome: Ongoing     Problem: Patient Education: Go to Patient Education Activity  Goal: Patient/Family Education  Outcome: Ongoing     Problem: Fluid Volume:  Goal: Ability to maintain a balanced intake and output will improve  Description: Ability to maintain a balanced intake and output will improve  Outcome: Ongoing     Problem: Metabolic:  Goal: Ability to maintain appropriate glucose levels will improve  Description: Ability to maintain appropriate glucose levels will improve  Outcome: Ongoing     Problem: Nutritional:  Goal: Maintenance of

## 2021-01-28 NOTE — PROGRESS NOTES
pulmonary      SUBJECTIVE: feels a little better     OBJECTIVE    VITALS:  BP (!) 129/94   Pulse 76   Temp 97.4 °F (36.3 °C) (Oral)   Resp 20   Ht 5' 7\" (1.702 m)   Wt 177 lb 6.4 oz (80.5 kg)   SpO2 93%   BMI 27.78 kg/m²   HEAD AND FACE EXAM:  No throat injection, no active exudate,no thrush  NECK EXAM;No JVD, no masses, symmetrical  CHEST EXAM; Expansion equal and symmetrical, no masses  LUNG EXAM; Good breath sounds bilaterally. There are expiratory wheezes both lungs, there are crackles at both lung bases  CARDIOVASCULAR EXAM: Positive S1 and S2, no S3 or S4, no clicks ,no murmurs  RIGHT AND LEFT LOWER EXTRIMITY EXAM: No edema, no swelling, no inflamation  CNS EXAM: Alert and oriented X3          LABS   Lab Results   Component Value Date    WBC 14.2 (H) 01/27/2021    HGB 12.7 (L) 01/27/2021    HCT 38.3 (L) 01/27/2021    MCV 83.3 01/27/2021     01/27/2021     Lab Results   Component Value Date    CREATININE 0.9 01/28/2021    BUN 30 (H) 01/28/2021     01/28/2021    K 4.5 01/28/2021     01/28/2021    CO2 22 01/28/2021     Lab Results   Component Value Date    INR 1.05 01/25/2021    PROTIME 12.7 01/25/2021        No results found for: PHOS   No results for input(s): PH, PO2ART, VLF9BJG, HCO3, BEART, O2SAT in the last 72 hours. Wt Readings from Last 3 Encounters:   01/24/21 177 lb 6.4 oz (80.5 kg)   01/24/21 190 lb (86.2 kg)   01/09/21 195 lb (88.5 kg)               ASSESMENT  Ac resp failure  covid pneumpnia  Ac bronchospasm        PLAN  1. cpm  2.  Cont o2 adm    1/28/2021  Donnie Mathis M.D.

## 2021-01-28 NOTE — PROGRESS NOTES
[address]    Hospitalist Progress Note      Name:  Maggie Humphrey /Age/Sex: 1950  (65 y.o. male)   MRN & CSN:  [id number] & [id number] Admission Date/Time: 2021  9:09 PM   Location:  -A PCP: Stasia Cogan, MD         Hospital Day: 5    Assessment and Plan:   Maggie Humphrey is a 65 y.o.  male  Who presented with complaint of shortness of breath and cough. Patient was diagnosed with COVID-19 on 2021. He was admitted to [facility] from 2021 and discharged. On 1/15/2021 with 2 L of oxygen. Acute hypoxic respiratory failure due to COVID-19 pneumonia    SARS-COV2 detected on 21 at an OSH - now positive for COVID IgG  Requiring higher oxygen today - will titrate down as able   Procalcitonin low at 0.266   No convalescent plasma (positive for COVID IgG)  Continue with Solu-Medrol, Remdesivir end date   Pulmonary following     Paroxysmal AFIB - rate controled     Initially bradycardic -HR in the range of 53-60   Continue with Eliquis, Amiodarone drip DC per Cardiology     Acute kidney injury - resolved     Baseline CR 0.9. CR admission 1.4..  0.9   Avoid ACE inhibitor/ARB, DC IVF    DM type II -A1c 9.4, continue with Lantus and insulin sliding scale    Chronic medical conditions: Resume home medications as appropriate unless contraindicated     Hypertension   Hyperlipidemia    Diet DIET CARB CONTROL; Carb Control: 4 carb choices (60 gms)/meal   DVT Prophylaxis [] Lovenox, []  Heparin, [] SCDs, []No VTE prophylaxis, patient ambulating   GI Prophylaxis [] PPI, [] H2 Blocker, [] No GI prophylaxis, patient is receiving diet/Tube Feeds   Code Status Full Code   Disposition Patient requires continued admission due to respiratory failure   MDM [] Low, [x] Moderate,[]  High     History of Present Illness: Subjective     Patient Seen & Examined at the bedside      Patient is resting in bed with no distress while on nasal cannula  However, saturation deeps to low 80s and requiring

## 2021-01-28 NOTE — PROGRESS NOTES
CARDIOLOGY PROGRESS NOTE                                                  Name:  Tony Herring /Age/Sex: 1950  (65 y.o. male)   MRN & CSN:  [id number] & [id number] Admission Date/Time: 2021  9:09 PM   Location:  -A PCP: Kusum Landin MD         Admit Date:  2021  Hospital Day: 5      SUBJECTIVE:   Seen patient as follow up as consultation for AFIB    No chest pain. No shortness of breath  No palpations    TELEMETRY: Atrial fibrillation       Intake/Output Summary (Last 24 hours) at 2021 1030  Last data filed at 2021 0921  Gross per 24 hour   Intake 1190 ml   Output 1225 ml   Net -35 ml       Assessment/Plan:        1. New onset atrial fibrillation in the setting of COVID-19 pneumonia. His chads vas score is 2. Started patient on Eliquis. Rate is currently controlled will avoid any AV troy blocking agents. Outpatient follow-up with Dr. Maikel Ellis, will schedule for LISA guided cardioversion as outpatient once fully recovered from COVID-19  2. Bradycardia, A. fib with slow ventricular response. Stop Cardizem 180 mg daily. Stop amiodarone. 3. COVID-19: Treatment as per ICU team.  Patient is on remdesivir and steroids. 4. Insulin-dependent diabetes mellitus: Continue with insulin  5. Hyperlipidemia: Continue with Lipitor 40 mg daily       EKG shows atrial fibrillation at 55 bpm     ECHO         Summary   Expedited imaging was used to obtain protocol images to reduce the   sonographer's exposure during COVID-19 pandemic.   Left ventricular systolic function is normal.   Ejection fraction is visually estimated at 50-55%.   No significant valvular abnormalities.   No evidence of any pericardial effusion.   Dilation of the aortic root.       Past medical history:    has a past medical history of COVID-19, Diabetes mellitus ([address] Hyperlipidemia, and Hypertension.   Past surgical history:   has a past surgical history that includes Endoscopy, colon, diagnostic; Upper  insulin glargine  20 Units Subcutaneous Nightly    atorvastatin  40 mg Oral Daily    sodium chloride flush  10 mL Intravenous 2 times per day    remdesivir IVPB  100 mg Intravenous Q24H      dextrose      sodium chloride       guaiFENesin-codeine, sodium chloride flush, promethazine **OR** ondansetron, polyethylene glycol, acetaminophen **OR** acetaminophen, glucose, dextrose, glucagon (rDNA), dextrose, sodium chloride, sodium chloride  No Known Allergies    Lab Data:  CBC:   Recent Labs     01/27/21  0600   WBC 14.2*   HGB 12.7*   HCT 38.3*   MCV 83.3        BMP:   Recent Labs     01/26/21  0604 01/27/21  0600 01/28/21  0615    137 135   K 5.1 4.4 4.5    103 104   CO2 25 21 22   BUN 41* 32* 30*   CREATININE 1.1 0.9 0.9     LIVER PROFILE:   Recent Labs     01/26/21  0604 01/27/21  0600 01/28/21  0615   AST 19 19 27   ALT 35 34 44*   BILIDIR 0.3 0.2 0.2   BILITOT 0.8 0.9 0.9   ALKPHOS 41 [phone] Vickie Russell MD 1/28/2021 10:30 AM

## 2021-01-29 LAB
ALBUMIN SERPL-MCNC: 3.2 GM/DL (ref 3.4–5)
ALP BLD-CCNC: 43 IU/L (ref 40–129)
ALT SERPL-CCNC: 90 U/L (ref 10–40)
ANION GAP SERPL CALCULATED.3IONS-SCNC: 9 MMOL/L (ref 4–16)
AST SERPL-CCNC: 45 IU/L (ref 15–37)
BILIRUB SERPL-MCNC: 1.2 MG/DL (ref 0–1)
BILIRUBIN DIRECT: 0.3 MG/DL (ref 0–0.3)
BILIRUBIN, INDIRECT: 0.9 MG/DL (ref 0–0.7)
BUN BLDV-MCNC: 27 MG/DL (ref 6–23)
CALCIUM SERPL-MCNC: 8.8 MG/DL (ref 8.3–10.6)
CHLORIDE BLD-SCNC: 100 MMOL/L (ref 99–110)
CO2: 28 MMOL/L (ref 21–32)
CREAT SERPL-MCNC: 1.1 MG/DL (ref 0.9–1.3)
D DIMER: 673 NG/ML(DDU)
FERRITIN: 1052 NG/ML (ref 30–400)
GFR AFRICAN AMERICAN: >60 ML/MIN/1.73M2
GFR NON-AFRICAN AMERICAN: >60 ML/MIN/1.73M2
GLUCOSE BLD-MCNC: 144 MG/DL (ref 70–99)
GLUCOSE BLD-MCNC: 272 MG/DL (ref 70–99)
GLUCOSE BLD-MCNC: 348 MG/DL (ref 70–99)
GLUCOSE BLD-MCNC: 68 MG/DL (ref 70–99)
GLUCOSE BLD-MCNC: 72 MG/DL (ref 70–99)
POTASSIUM SERPL-SCNC: 4.2 MMOL/L (ref 3.5–5.1)
SODIUM BLD-SCNC: 137 MMOL/L (ref 135–145)
TOTAL PROTEIN: 5.9 GM/DL (ref 6.4–8.2)

## 2021-01-29 PROCEDURE — 6370000000 HC RX 637 (ALT 250 FOR IP): Performed by: INTERNAL MEDICINE

## 2021-01-29 PROCEDURE — 85379 FIBRIN DEGRADATION QUANT: CPT

## 2021-01-29 PROCEDURE — 94761 N-INVAS EAR/PLS OXIMETRY MLT: CPT

## 2021-01-29 PROCEDURE — 82728 ASSAY OF FERRITIN: CPT

## 2021-01-29 PROCEDURE — 82248 BILIRUBIN DIRECT: CPT

## 2021-01-29 PROCEDURE — 99232 SBSQ HOSP IP/OBS MODERATE 35: CPT | Performed by: INTERNAL MEDICINE

## 2021-01-29 PROCEDURE — 80053 COMPREHEN METABOLIC PANEL: CPT

## 2021-01-29 PROCEDURE — 2700000000 HC OXYGEN THERAPY PER DAY

## 2021-01-29 PROCEDURE — 6360000002 HC RX W HCPCS: Performed by: INTERNAL MEDICINE

## 2021-01-29 PROCEDURE — 82962 GLUCOSE BLOOD TEST: CPT

## 2021-01-29 PROCEDURE — 2580000003 HC RX 258: Performed by: INTERNAL MEDICINE

## 2021-01-29 PROCEDURE — 1200000000 HC SEMI PRIVATE

## 2021-01-29 RX ORDER — METOPROLOL SUCCINATE 25 MG/1
25 TABLET, EXTENDED RELEASE ORAL DAILY
Status: DISCONTINUED | OUTPATIENT
Start: 2021-01-29 | End: 2021-01-30 | Stop reason: HOSPADM

## 2021-01-29 RX ADMIN — APIXABAN 5 MG: 5 TABLET, FILM COATED ORAL at 09:39

## 2021-01-29 RX ADMIN — INSULIN GLARGINE 20 UNITS: 100 INJECTION, SOLUTION SUBCUTANEOUS at 21:12

## 2021-01-29 RX ADMIN — APIXABAN 5 MG: 5 TABLET, FILM COATED ORAL at 21:06

## 2021-01-29 RX ADMIN — METHYLPREDNISOLONE SODIUM SUCCINATE 40 MG: 40 INJECTION, POWDER, FOR SOLUTION INTRAMUSCULAR; INTRAVENOUS at 09:39

## 2021-01-29 RX ADMIN — ATORVASTATIN CALCIUM 40 MG: 40 TABLET, FILM COATED ORAL at 09:39

## 2021-01-29 RX ADMIN — SODIUM CHLORIDE, PRESERVATIVE FREE 10 ML: 5 INJECTION INTRAVENOUS at 09:40

## 2021-01-29 RX ADMIN — FAMOTIDINE 20 MG: 20 TABLET, FILM COATED ORAL at 09:39

## 2021-01-29 RX ADMIN — SODIUM CHLORIDE, PRESERVATIVE FREE 10 ML: 5 INJECTION INTRAVENOUS at 21:07

## 2021-01-29 RX ADMIN — METOPROLOL SUCCINATE 25 MG: 25 TABLET, EXTENDED RELEASE ORAL at 12:48

## 2021-01-29 RX ADMIN — FAMOTIDINE 20 MG: 20 TABLET, FILM COATED ORAL at 21:06

## 2021-01-29 RX ADMIN — INSULIN LISPRO 9 UNITS: 100 INJECTION, SOLUTION INTRAVENOUS; SUBCUTANEOUS at 16:48

## 2021-01-29 ASSESSMENT — PAIN SCALES - GENERAL
PAINLEVEL_OUTOF10: 0
PAINLEVEL_OUTOF10: 0

## 2021-01-29 NOTE — PROGRESS NOTES
pulmonary      SUBJECTIVE: sleeping and no sob     OBJECTIVE    VITALS:  BP (!) 139/99   Pulse 64   Temp 96 °F (35.6 °C) (Oral)   Resp 19   Ht 5' 7\" (1.702 m)   Wt 177 lb 6.4 oz (80.5 kg)   SpO2 99%   BMI 27.78 kg/m²   HEAD AND FACE EXAM:  No throat injection, no active exudate,no thrush  NECK EXAM;No JVD, no masses, symmetrical  CHEST EXAM; Expansion equal and symmetrical, no masses  LUNG EXAM; Good breath sounds bilaterally. There are expiratory wheezes both lungs, there are crackles at both lung bases  CARDIOVASCULAR EXAM: Positive S1 and S2, no S3 or S4, no clicks ,no murmurs  RIGHT AND LEFT LOWER EXTRIMITY EXAM: No edema, no swelling, no inflamation            LABS   Lab Results   Component Value Date    WBC 14.2 (H) 01/27/2021    HGB 12.7 (L) 01/27/2021    HCT 38.3 (L) 01/27/2021    MCV 83.3 01/27/2021     01/27/2021     Lab Results   Component Value Date    CREATININE 1.1 01/29/2021    BUN 27 (H) 01/29/2021     01/29/2021    K 4.2 01/29/2021     01/29/2021    CO2 28 01/29/2021     Lab Results   Component Value Date    INR 1.05 01/25/2021    PROTIME 12.7 01/25/2021        No results found for: PHOS   No results for input(s): PH, PO2ART, YDV7VGI, HCO3, BEART, O2SAT in the last 72 hours. Wt Readings from Last 3 Encounters:   01/24/21 177 lb 6.4 oz (80.5 kg)   01/24/21 190 lb (86.2 kg)   01/09/21 195 lb (88.5 kg)               ASSESMENT  Ac resp failure  covid pneumonia  Ac bronchospasm        PLAN  1. cpm  2.  Cont o2    1/29/2021  Wes Trevino M.D.

## 2021-01-29 NOTE — PLAN OF CARE
Problem: Airway Clearance - Ineffective  Goal: Achieve or maintain patent airway  Outcome: Ongoing     Problem: Gas Exchange - Impaired  Goal: Absence of hypoxia  Outcome: Ongoing  Goal: Promote optimal lung function  Outcome: Ongoing     Problem: Breathing Pattern - Ineffective  Goal: Ability to achieve and maintain a regular respiratory rate  Outcome: Ongoing     Problem:  Body Temperature -  Risk of, Imbalanced  Goal: Ability to maintain a body temperature within defined limits  Outcome: Ongoing  Goal: Will regain or maintain usual level of consciousness  Outcome: Ongoing  Goal: Complications related to the disease process, condition or treatment will be avoided or minimized  Outcome: Ongoing     Problem: Isolation Precautions - Risk of Spread of Infection  Goal: Prevent transmission of infection  Outcome: Ongoing     Problem: Nutrition Deficits  Goal: Optimize nutritional status  Outcome: Ongoing     Problem: Risk for Fluid Volume Deficit  Goal: Maintain normal heart rhythm  Outcome: Ongoing  Goal: Maintain absence of muscle cramping  Outcome: Ongoing  Goal: Maintain normal serum potassium, sodium, calcium, phosphorus, and pH  Outcome: Ongoing     Problem: Loneliness or Risk for Loneliness  Goal: Demonstrate positive use of time alone when socialization is not possible  Outcome: Ongoing     Problem: Fatigue  Goal: Verbalize increase energy and improved vitality  Outcome: Ongoing     Problem: Patient Education: Go to Patient Education Activity  Goal: Patient/Family Education  Outcome: Ongoing     Problem: Fluid Volume:  Goal: Ability to maintain a balanced intake and output will improve  Description: Ability to maintain a balanced intake and output will improve  Outcome: Ongoing     Problem: Metabolic:  Goal: Ability to maintain appropriate glucose levels will improve  Description: Ability to maintain appropriate glucose levels will improve  Outcome: Ongoing     Problem: Nutritional:  Goal: Maintenance of adequate nutrition will improve  Description: Maintenance of adequate nutrition will improve  Outcome: Ongoing     Problem: Falls - Risk of:  Goal: Will remain free from falls  Description: Will remain free from falls  Outcome: Ongoing  Goal: Absence of physical injury  Description: Absence of physical injury  Outcome: Ongoing

## 2021-01-29 NOTE — PROGRESS NOTES
[address]    Hospitalist Progress Note      Name:  Fabian Klein /Age/Sex: 1950  (65 y.o. male)   MRN & CSN:  [id number] & [id number] Admission Date/Time: 2021  9:09 PM   Location:  -A PCP: Esme Mac MD         Hospital Day: 6    Assessment and Plan:   Fabian Klein is a 65 y.o.  male  Who presented with complaint of shortness of breath and cough. Patient was diagnosed with COVID-19 on 2021. He was admitted to [facility] from 2021 and discharged. On 1/15/2021 with 2 L of oxygen. Acute hypoxic respiratory failure due to COVID-19 pneumonia    SARS-COV2 detected on 21 at an OSH - now positive for COVID IgG  Requiring higher oxygen today - will titrate down as able   Procalcitonin low at 0.266, D-dimer decreasing   No convalescent plasma (positive for COVID IgG)  Continue with Solu-Medrol, Completed Remdesivir   Pulmonary following     Paroxysmal AFIB - intermittently in RVR    Continue with Eliquis, Amiodarone and Cardizem DC per Cardiology and started on Toprol    Acute kidney injury - resolved     Baseline CR 0.9. CR admission 1.4..  0.9   Avoid ACE inhibitor/ARB, DC IVF    DM type II -A1c 9.4, continue with Lantus and insulin sliding scale    Chronic medical conditions: Resume home medications as appropriate unless contraindicated     Hypertension   Hyperlipidemia    Diet DIET CARB CONTROL; Carb Control: 4 carb choices (60 gms)/meal   DVT Prophylaxis [] Lovenox, []  Heparin, [] SCDs, []No VTE prophylaxis, patient ambulating   GI Prophylaxis [] PPI, [] H2 Blocker, [] No GI prophylaxis, patient is receiving diet/Tube Feeds   Code Status Full Code   Disposition Patient requires continued admission due to respiratory failure   MDM [] Low, [x] Moderate,[]  High     History of Present Illness: Subjective     Patient Seen & Examined at the bedside      Patient is resting in bed with no distress while on nasal cannula  Continues to require 6 to 7 L of oxygen via NC - HR spikes up to 150 when pacing around in his room   Denies any chest pain    Ten point ROS reviewed negative, unless as noted above    Objective: Intake/Output Summary (Last 24 hours) at 1/29/2021 1019  Last data filed at 1/29/2021 0433  Gross per 24 hour   Intake 480 ml   Output 1200 ml   Net -720 ml      Vitals:   Vitals:    01/29/21 0950   BP:    Pulse:    Resp: 20   Temp:    SpO2: 94%     Physical Exam:    GEN Awake male, resting in bed in no apparent distress. Appears given age. RESP diffuse rhonchi no wheezes, rales    CARDIO/VASC -S1/S2 auscultated. tachy, irregular rate without appreciable murmurs, rubs, or gallops. Peripheral pulses equal bilaterally and palpable. No peripheral edema. GI Abdomen is soft without significant tenderness, masses, or guarding. Bowel sounds are normoactive. Rectal exam deferred.  Epstein catheter is not present.     Medications:   Medications:    apixaban  5 mg Oral BID    famotidine  20 mg Oral BID    insulin lispro  5 Units Subcutaneous TID WC    methylPREDNISolone  40 mg Intravenous Daily    insulin lispro  0-18 Units Subcutaneous TID WC    insulin lispro  0-9 Units Subcutaneous Nightly    insulin glargine  20 Units Subcutaneous Nightly    atorvastatin  40 mg Oral Daily    sodium chloride flush  10 mL Intravenous 2 times per day      Infusions:    dextrose      sodium chloride       PRN Meds:     guaiFENesin-codeine, 5 mL, Q6H PRN      sodium chloride flush, 10 mL, PRN      promethazine, 12.5 mg, Q6H PRN    Or      ondansetron, 4 mg, Q6H PRN      polyethylene glycol, 17 g, Daily PRN      acetaminophen, 650 mg, Q6H PRN    Or      acetaminophen, 650 mg, Q6H PRN      glucose, 15 g, PRN      dextrose, 12.5 g, PRN      glucagon (rDNA), 1 mg, PRN      dextrose, 100 mL/hr, PRN      sodium chloride, , PRN      sodium chloride, 30 mL, PRN          Electronically signed by Thedora Baumgarten, MD on 1/29/2021 at 10:19 AM

## 2021-01-29 NOTE — PROGRESS NOTES
CARDIOLOGY PROGRESS NOTE                                                  Name:  Marian Osuna /Age/Sex: 1950  (65 y.o. male)   MRN & CSN:  [id number] & [id number] Admission Date/Time: 2021  9:09 PM   Location:  -A PCP: Amy Ross MD         Admit Date:  2021  Hospital Day: 6      SUBJECTIVE:   Seen patient as follow up as consultation for AFIB    No chest pain. No shortness of breath  No palpations    TELEMETRY: Atrial fibrillation       Intake/Output Summary (Last 24 hours) at 2021 0941  Last data filed at 2021 0433  Gross per 24 hour   Intake 480 ml   Output 1200 ml   Net -720 ml       Assessment/Plan:        1. New onset atrial fibrillation in the setting of COVID-19 pneumonia. His chads vas score is 2. Started patient on Eliquis. Rate is currently controlled will avoid any AV troy blocking agents. Outpatient follow-up with Dr. Gary Rascon, will schedule for LISA guided cardioversion as outpatient once fully recovered from COVID-19  2. Bradycardia, A. fib with slow ventricular response. Stopped Cardizem 180 and amiodarone. However, with ambulation, HR goes as high as 150. Will start low dose of Toprol XL. Baseline HR ~ 70-90/min   3. COVID-19: Treatment as per ICU team.  Patient is on remdesivir and steroids. 4. Insulin-dependent diabetes mellitus: Continue with insulin  5.  Hyperlipidemia: Continue with Lipitor 40 mg daily     Please call us with further questions       EKG shows atrial fibrillation at 55 bpm     ECHO         Summary   Expedited imaging was used to obtain protocol images to reduce the   sonographer's exposure during COVID-19 pandemic.   Left ventricular systolic function is normal.   Ejection fraction is visually estimated at 50-55%.   No significant valvular abnormalities.   No evidence of any pericardial effusion.   Dilation of the aortic root.       Past medical history:    has a past medical history of COVID-19, Diabetes mellitus ([address] Hyperlipidemia, and Hypertension. Past surgical history:   has a past surgical history that includes Endoscopy, colon, diagnostic; Upper gastrointestinal endoscopy; and Colonoscopy (06/01/2015). Social History:   reports that he has quit smoking. He has never used smokeless tobacco. He reports that he does not drink alcohol or use drugs. Family history:  family history is not on file. OBJECTIVE:     BP 93/63   Pulse 69   Temp 96 °F (35.6 °C) (Oral)   Resp 19   Ht 5' 7\" (1.702 m)   Wt 177 lb 6.4 oz (80.5 kg)   SpO2 98%   BMI 27.78 kg/m²       Intake/Output Summary (Last 24 hours) at 1/29/2021 0941  Last data filed at 1/29/2021 0433  Gross per 24 hour   Intake 480 ml   Output 1200 ml   Net -720 ml       Physical Exam:    Constitutional:  Well developed, Well nourished, No acute distress, Non-toxic appearance. HENT:  Normocephalic, Atraumatic, Bilateral external ears normal, Oropharynx moist, No oral exudates, Nose normal. Neck- Normal range of motion, No tenderness, Supple, No stridor. Eyes:  EOMI, Conjunctiva normal, No discharge. Respiratory:  Normal breath sounds, No respiratory distress, No wheezing, No chest tenderness. ,no use of accessory muscles, diaphragm movement is normal  Cardiovascular S1-S2 No Murmurs, added sounds. Normal rate IRIR rhythm. No rubs gallops. Carotid pulses and amplitude are normal no bruit noted. Pedal pulses normal femoral pulses normal.  No pedal edema  GI:  Bowel sounds normal, Soft, No tenderness  : No CVA tenderness. Musculoskeletal: No edema, No tenderness, No cyanosis, No clubbing. Back- No tenderness. Integument:  Warm, Dry, No erythema, No rash. Lymphatic:  No lymphadenopathy noted. Neurologic:  Alert & oriented x 3, No focal deficits noted.    Psychiatric:  Affect normal, Judgment normal, Mood normal.           MEDICATIONS:     apixaban  5 mg Oral BID    famotidine  20 mg Oral BID    insulin lispro  5 Units Subcutaneous TID WC  methylPREDNISolone  40 mg Intravenous Daily    insulin lispro  0-18 Units Subcutaneous TID WC    insulin lispro  0-9 Units Subcutaneous Nightly    insulin glargine  20 Units Subcutaneous Nightly    atorvastatin  40 mg Oral Daily    sodium chloride flush  10 mL Intravenous 2 times per day      dextrose      sodium chloride       guaiFENesin-codeine, sodium chloride flush, promethazine **OR** ondansetron, polyethylene glycol, acetaminophen **OR** acetaminophen, glucose, dextrose, glucagon (rDNA), dextrose, sodium chloride, sodium chloride  No Known Allergies    Lab Data:  CBC:   Recent Labs     01/27/21  0600   WBC 14.2*   HGB 12.7*   HCT 38.3*   MCV 83.3        BMP:   Recent Labs     01/27/21  0600 01/28/21  0615 01/29/21  0434    135 137   K 4.4 4.5 4.2    104 100   CO2 21 22 28   BUN 32* 30* 27*   CREATININE 0.9 0.9 1.1     LIVER PROFILE:   Recent Labs     01/27/21  0600 01/28/21  0615 01/29/21  0434   AST 19 27 45*   ALT 34 44* 90*   BILIDIR 0.2 0.2 0.3   BILITOT 0.9 0.9 1.2*   ALKPHOS 43 45* MD Salomon 1/29/2021 9:41 AM

## 2021-01-29 NOTE — PROGRESS NOTES
This RN notified Dr. Dione Razo, cardiologist, of 2 back to back pauses on telemetry monitor. Provider ordered to continue to observe. Will continue to monitor.

## 2021-01-30 VITALS
OXYGEN SATURATION: 93 % | SYSTOLIC BLOOD PRESSURE: 127 MMHG | DIASTOLIC BLOOD PRESSURE: 87 MMHG | HEART RATE: 85 BPM | HEIGHT: 67 IN | RESPIRATION RATE: 17 BRPM | TEMPERATURE: 97.5 F | WEIGHT: 177.4 LBS | BODY MASS INDEX: 27.84 KG/M2

## 2021-01-30 LAB
ANION GAP SERPL CALCULATED.3IONS-SCNC: 13 MMOL/L (ref 4–16)
BUN BLDV-MCNC: 30 MG/DL (ref 6–23)
CALCIUM SERPL-MCNC: 8.8 MG/DL (ref 8.3–10.6)
CHLORIDE BLD-SCNC: 100 MMOL/L (ref 99–110)
CO2: 24 MMOL/L (ref 21–32)
CREAT SERPL-MCNC: 0.9 MG/DL (ref 0.9–1.3)
D DIMER: 650 NG/ML(DDU)
FERRITIN: 974 NG/ML (ref 30–400)
GFR AFRICAN AMERICAN: >60 ML/MIN/1.73M2
GFR NON-AFRICAN AMERICAN: >60 ML/MIN/1.73M2
GLUCOSE BLD-MCNC: 70 MG/DL (ref 70–99)
GLUCOSE BLD-MCNC: 88 MG/DL (ref 70–99)
POTASSIUM SERPL-SCNC: 4.1 MMOL/L (ref 3.5–5.1)
SODIUM BLD-SCNC: 137 MMOL/L (ref 135–145)

## 2021-01-30 PROCEDURE — 6370000000 HC RX 637 (ALT 250 FOR IP): Performed by: INTERNAL MEDICINE

## 2021-01-30 PROCEDURE — 99231 SBSQ HOSP IP/OBS SF/LOW 25: CPT | Performed by: INTERNAL MEDICINE

## 2021-01-30 PROCEDURE — 2580000003 HC RX 258: Performed by: INTERNAL MEDICINE

## 2021-01-30 PROCEDURE — 82962 GLUCOSE BLOOD TEST: CPT

## 2021-01-30 PROCEDURE — 85379 FIBRIN DEGRADATION QUANT: CPT

## 2021-01-30 PROCEDURE — 6360000002 HC RX W HCPCS: Performed by: INTERNAL MEDICINE

## 2021-01-30 PROCEDURE — 82728 ASSAY OF FERRITIN: CPT

## 2021-01-30 PROCEDURE — 80048 BASIC METABOLIC PNL TOTAL CA: CPT

## 2021-01-30 RX ORDER — DEXAMETHASONE 6 MG/1
6 TABLET ORAL
Qty: 6 TABLET | Refills: 0 | Status: SHIPPED | OUTPATIENT
Start: 2021-01-30 | End: 2021-02-05

## 2021-01-30 RX ORDER — METOPROLOL SUCCINATE 25 MG/1
25 TABLET, EXTENDED RELEASE ORAL DAILY
Qty: 30 TABLET | Refills: 1 | Status: SHIPPED | OUTPATIENT
Start: 2021-01-30 | End: 2021-03-02 | Stop reason: SDUPTHER

## 2021-01-30 RX ADMIN — METHYLPREDNISOLONE SODIUM SUCCINATE 40 MG: 40 INJECTION, POWDER, FOR SOLUTION INTRAMUSCULAR; INTRAVENOUS at 09:17

## 2021-01-30 RX ADMIN — FAMOTIDINE 20 MG: 20 TABLET, FILM COATED ORAL at 09:16

## 2021-01-30 RX ADMIN — ATORVASTATIN CALCIUM 40 MG: 40 TABLET, FILM COATED ORAL at 09:16

## 2021-01-30 RX ADMIN — APIXABAN 5 MG: 5 TABLET, FILM COATED ORAL at 09:16

## 2021-01-30 RX ADMIN — METOPROLOL SUCCINATE 25 MG: 25 TABLET, EXTENDED RELEASE ORAL at 09:16

## 2021-01-30 RX ADMIN — SODIUM CHLORIDE, PRESERVATIVE FREE 10 ML: 5 INJECTION INTRAVENOUS at 09:17

## 2021-01-30 ASSESSMENT — PAIN SCALES - GENERAL: PAINLEVEL_OUTOF10: 0

## 2021-01-30 NOTE — PROGRESS NOTES
Discharge paperwork reviewed with pt. All questions answered. Pt calling wife for ride. Discharge paperwork with pt belongings. Pt educated to put call light on when wife arrives and tech will wheel pt out in wheelchair.

## 2021-01-30 NOTE — PLAN OF CARE
Problem: Airway Clearance - Ineffective  Goal: Achieve or maintain patent airway  Outcome: Ongoing     Problem: Gas Exchange - Impaired  Goal: Absence of hypoxia  Outcome: Ongoing  Goal: Promote optimal lung function  Outcome: Ongoing     Problem: Breathing Pattern - Ineffective  Goal: Ability to achieve and maintain a regular respiratory rate  Outcome: Ongoing     Problem: Isolation Precautions - Risk of Spread of Infection  Goal: Prevent transmission of infection  Outcome: Ongoing     Problem: Falls - Risk of:  Goal: Will remain free from falls  Description: Will remain free from falls  Outcome: Ongoing  Goal: Absence of physical injury  Description: Absence of physical injury  Outcome: Ongoing

## 2021-01-30 NOTE — PROGRESS NOTES
Comprehensive Nutrition Assessment    Type and Reason for Visit:  Initial, RD Nutrition Re-Screen/LOS    Nutrition Recommendations/Plan:     Continue CHO controlled diet; will add high protein, low calorie oral supplement BID to promote adequate oral intake. When recovered, recommend referral to outpatient diabetes education considering A1C greater than 9. Nutrition Assessment:  Pt with second admission related to Clara also with DWAYNE. On CHO controlled diet with most meal intakes greater than 75% although last two recorded meals have been less than 50% intake. Malnutrition Assessment:  Malnutrition Status: At risk for malnutrition    Estimated Daily Nutrient Needs:  Energy (kcal):  2138-5771(1-1.2 Activity Factor, [address]); Weight Used for Energy Requirements:      Actual  Protein (g):  (1.2-1.3 g/kg ); Weight Used for Protein Requirements:  Current        Fluid (ml/day):  2288-4921; Method Used for Fluid Requirements:  1 ml/kcal      Nutrition Related Findings:  Labs reviewed, glucose variable 150-350, A1C 9.2      Wounds:  None       Current Nutrition Therapies:    DIET CARB CONTROL; Carb Control: 4 carb choices (60 gms)/meal    Anthropometric Measures:  · Height: 5' 7\" (170.2 cm)  · Current Body Weight: 177 lb 7.5 oz (80.5 kg)   · Usual Body Weight: 195 lb (88.5 kg)(stated 1/9/21)     · Ideal Body Weight: 148 lbs; % Ideal Body Weight 119.9 %   · BMI: 27.8  · BMI Categories: Overweight (BMI 25.0-29. 9)       Nutrition Diagnosis:   · Inadequate oral intake related to impaired respiratory function as evidenced by intake 26-50%      Nutrition Interventions:   Food and/or Nutrient Delivery:  Continue Current Diet  Nutrition Education/Counseling:  Education not appropriate   Coordination of Nutrition Care:  Continue to monitor while inpatient    Goals:  Pt will tolerate greater than 75% intake of most meals and supplements during los        Nutrition Monitoring and Evaluation: Food/Nutrient Intake Outcomes:  Food and Nutrient Intake  Physical Signs/Symptoms Outcomes:  Biochemical Data     Discharge Planning:    Recommend pursue outpatient diabetes education     Electronically signed by Gregorio Padron RD, LD on 1/29/21 at 10:31 PM EST    Contact: [phone]

## 2021-01-30 NOTE — PROGRESS NOTES
Pt stating he is leaving today either as a discharge or as ama if needed. [place] aware of pt request and ok with pt leaving today    Per charge nurse message was sent to hospitalist to make him aware that pt is requesting a morning discharge    Pt continues to maintain o2 on room air since yesterday being weaned off o2 support.

## 2021-01-30 NOTE — PROGRESS NOTES
Cardiology Progress Note     Admit Date:  1/24/2021    Consult reason/ Seen today for :   afib    Subjective and  Overnight Events : Adamant he says he should have been discharged yesterday quite angry he wants to be discharged      Chief complain on admission : 65 y. o.year old who is admitted forNo chief complaint on file. Assessment / Plan:  A. fib rate controlled in setting of Covid pneumonia continue rate control strategy and anticoagulation  Follow-up in office for outpatient plan with Dr. Venora Scheuermann  DVT Prophylaxis if no contraindication    Past medical history:    has a past medical history of COVID-19, Diabetes mellitus ([facility] Hyperlipidemia, and Hypertension. Past surgical history:   has a past surgical history that includes Endoscopy, colon, diagnostic; Upper gastrointestinal endoscopy; and Colonoscopy (06/01/2015). Social History:   reports that he has quit smoking. He has never used smokeless tobacco. He reports that he does not drink alcohol or use drugs. Family history:  family history is not on file. No Known Allergies    Review of Systems:    All 14 systems were reviewed and are negative  Except for the positive findings  which as documented     /84   Pulse 66   Temp 97.2 °F (36.2 °C) (Oral)   Resp 16   Ht 5' 7\" (1.702 m)   Wt 177 lb 6.4 oz (80.5 kg)   SpO2 90%   BMI 27.78 kg/m²       Intake/Output Summary (Last 24 hours) at 1/30/2021 0836  Last data filed at 1/30/2021 0513  Gross per 24 hour   Intake 720 ml   Output --   Net 720 ml     Physical Exam:  Constitutional:  Well developed, Well nourished, No acute distress, Non-toxic appearance. HENT:  Normocephalic, Atraumatic, Bilateral external ears normal, Oropharynx moist, No oral exudates, Nose normal. Neck- Normal range of motion, No tenderness, Supple, No stridor. Eyes:  PERRL, EOMI, Conjunctiva normal, No discharge.    Respiratory:  Normal breath sounds, No respiratory distress, No wheezing, No chest tenderness. Cardiovascular:  Normal heart rate, Normal rhythm, No murmurs, No rubs, No gallops, JVP not elevated  Abdomen/GI:  Bowel sounds normal, Soft, No tenderness, No masses, No pulsatile masses. Musculoskeletal:  Intact distal pulses, No edema, No tenderness, No cyanosis, No clubbing. Good range of motion in all major joints. No tenderness to palpation or major deformities noted. Back- No tenderness. Integument:  Warm, Dry, No erythema, No rash. Lymphatic:  No lymphadenopathy noted. Neurologic:  Alert & oriented x 3, Normal motor function, Normal sensory function, No focal deficits noted. Psychiatric:  Affect  and  Mood :no change    Medications:    metoprolol succinate  25 mg Oral Daily    apixaban  5 mg Oral BID    famotidine  20 mg Oral BID    insulin lispro  5 Units Subcutaneous TID WC    methylPREDNISolone  40 mg Intravenous Daily    insulin lispro  0-18 Units Subcutaneous TID WC    insulin lispro  0-9 Units Subcutaneous Nightly    insulin glargine  20 Units Subcutaneous Nightly    atorvastatin  40 mg Oral Daily    sodium chloride flush  10 mL Intravenous 2 times per day      dextrose      sodium chloride       guaiFENesin-codeine, sodium chloride flush, promethazine **OR** ondansetron, polyethylene glycol, acetaminophen **OR** acetaminophen, glucose, dextrose, glucagon (rDNA), dextrose, sodium chloride, sodium chloride    Lab Data:  CBC: No results for input(s): WBC, HGB, HCT, MCV, PLT in the last 72 hours. BMP:   Recent Labs     01/28/21  0615 01/29/21  0434 01/30/21  0545    137 137   K 4.5 4.2 4.1    100 100   CO2 22 28 24   BUN 30* 27* 30*   CREATININE 0.9 1.1 0.9     PT/INR: No results for input(s): PROTIME, INR in the last 72 hours. BNP:  No results for input(s): PROBNP in the last 72 hours. TROPONIN: No results for input(s): TROPONINT in the last 72 hours.      ECHO :   echocardiogram    Assessment:  70 y.o.year old who is admitted forNo chief complaint on file. , active issues as noted below:  Impression:  Principal Problem:    Acute respiratory failure with hypoxia ([address]  Resolved Problems:    * No resolved hospital problems. *            All labs, medications and tests reviewed by myself , continue all other medications of all above medical condition listed as is except for changes mentioned above. Thank you very much for consult , please call with questions.     Derek Pelayo MD 1/30/2021 8:36 AM

## 2021-01-30 NOTE — DISCHARGE SUMMARY
[address] Hospitalist     Discharge Summary    Name:  Sierra Alvarado /Age/Sex: 1950  (65 y.o. male)   MRN & CSN:  [id number] & [id number] Admission Date/Time: 2021  9:09 PM   Attending:  Yonny Maddox MD Discharging Physician: Yonny Maddox MD     HPI:     Please, see admission HPI in [address] and patient's hospital course below    Hospital Course:   Sierra Alvarado is a 65 y.o.  male who presented with complaint of shortness of breath and cough. Patient was diagnosed with COVID-19 on 2021. [facility] was [facility] to [facility] from 2021 and discharged.  On 1/15/2021 with 2 L of oxygen.       Acute hypoxic respiratory failure due to COVID-19 pneumonia     SARS-COV2 detected on 21 at an OSH - now positive for COVID IgG  Was requiring higher oxygen but now weaned off and on room air   Procalcitonin low at 0.266, D-dimer decreasing   No convalescent plasma (positive for COVID IgG)  Continue with Solu-Medrol, Completed Remdesivir   Pulmonary follow up as OP      Paroxysmal AFIB - intermittently in RVR     Continue with Eliquis, Amiodarone and Cardizem DC per Cardiology and started on Toprol - follow up with Cardio as OP     Acute kidney injury - resolved      Baseline CR 0.9.  CR admission 1.4.. 0.9   Avoid ACE inhibitor/ARB, DC IVF     DM type II -A1c 9.4, continue with Lantus and insulin sliding scale     Chronic medical conditions: Resume home medications as appropriate unless contraindicated      Hypertension   Hyperlipidemia    Patient is hemodynamically stable for DC to home    The patient expressed appropriate understanding of and agreement with the discharge recommendations, medications, and plan.      Consults this admission:  PHARMACY TO DOSE VANCOMYCIN  IP CONSULT TO PULMONOLOGY  IP CONSULT TO PHARMACY  IP CONSULT TO CARDIOLOGY    Discharge Instruction:   Follow up appointments: Cardiology  Primary care physician:  within 1 to 2 weeks    Diet:  cardiac diet   Activity: activity as tolerated  Disposition: Discharged to:   [x]Home, [][facility], []SNF, []Acute Rehab, []Hospice   Condition on discharge: Stable    Discharge Medications: Mya Irivn   Home Medication Instructions IFC:[id number]    Printed on:01/30/21 2174   Medication Information                      apixaban (ELIQUIS) 5 MG TABS tablet  Take 1 tablet by mouth 2 times daily             atorvastatin (LIPITOR) 40 MG tablet  Take 40 mg by mouth daily             dexamethasone (DECADRON) 6 MG tablet  Take 1 tablet by mouth daily (with breakfast) for 6 days             famciclovir (FAMVIR) 500 MG tablet  famciclovir 500 mg tablet             glimepiride (AMARYL) 2 MG tablet  Take 2 mg by mouth 2 times daily             guaiFENesin-codeine (TUSSI-ORGANIDIN NR) 100-10 MG/5ML syrup  Take 5 mLs by mouth every 4 hours as needed. metformin (GLUCOPHAGE) 500 MG tablet  Take 500 mg by mouth 3 times daily. metoprolol succinate (TOPROL XL) 25 MG extended release tablet  Take 1 tablet by mouth daily             quinapril-hydrochlorothiazide (ACCURETIC) 20-25 MG per tablet  Take 1 tablet by mouth daily. Subjective _patient is pacing around in his room with no oxygen anxious to go home, denies any chest pain or shortness of breath at this time    Objective Findings at Discharge:   /87   Pulse 85   Temp 97.2 °F (36.2 °C) (Oral)   Resp 17   Ht 5' 7\" (1.702 m)   Wt 177 lb 6.4 oz (80.5 kg)   SpO2 (!) 85%   BMI 27.78 kg/m²            PHYSICAL EXAM   GEN Awake male, resting in bed in no apparent distress. Appears given age. RESP Clear to auscultation, no wheezes, rales or rhonchi. CARDIO/VAS - S1/S2 auscultated. Regular rate without appreciable murmurs, rubs, or gallops. Peripheral pulses equal bilaterally and palpable. No peripheral edema. GI Abdomen is soft without significant tenderness, masses, or guarding. Bowel sounds are normoactive  MSK No gross joint deformities.  Spontaneous movement of all extremities  SKIN Normal coloration, warm, dry. NEURO Cranial nerves appear grossly intact, normal speech, no lateralizing weakness.     BMP/CBC  Recent Labs     01/28/21  0615 01/29/21  0434 01/30/21  0545    137 137   K 4.5 4.2 4.1    100 100   CO2 22 28 24   BUN 30* 27* 30*   CREATININE 0.9 1.1 0.9         Discharge Time of 33 minutes    Electronically signed by Keiry Petersen MD on 1/30/2021 at 9:35 AM

## 2021-02-01 ENCOUNTER — CARE COORDINATION (OUTPATIENT)
Dept: CASE MANAGEMENT | Age: 71
End: 2021-02-01

## 2021-02-01 NOTE — CARE COORDINATION
[facility]    Call within 2 business days of discharge: Yes    Patient: Hai Segura Patient : 1950   MRN: [id number]  Reason for Admission: Jennyfer Baig, Jon Resp Failure, DWAYNE  Discharge Date: 21 RARS: Readmission Risk Score: 14  Facility: [address] [place] [address]       Complaint Diagnosis Description Type Department Provider    21   Admission (Discharged) [address] Zheng Heredia MD    21 Shortness of Breath Acute respiratory failure with hypoxia ([address] . .. ED (TRANSFER) [address] [place] [address] ED [address] [facility],       Non-face-to-face services provided:  Obtained and reviewed discharge summary and/or continuity of care documents  Education of patient/family/caregiver/guardian to support self-management-Covid19 Pna  Assessment and support for treatment adherence and medication management-Eliquis, Decadron, Toprol    Care Transitions 24 Hour Call    Schedule Follow Up Appointment with PCP: Declined  Do you have any ongoing symptoms?: Yes  Patient-reported symptoms: Cough  Interventions for patient-reported symptoms: Other (Comment: No identified, reported need)  Do you have a copy of your discharge instructions?: Yes  Do you have all of your prescriptions and are they filled?: Yes  Have you been contacted by a [employer] [address]?: No  Have you scheduled your follow up appointment?: No (Comment: Patient to scheduled, denied need for assistance)  Were you discharged with any Home Care or Post Acute Services: No  Do you feel like you have everything you need to keep you well at home?: Yes  Care Transitions Interventions  No Identified Needs   Home Care Waiver: Declined        DME Assistance: Declined        Follow Up  No future appointments. Carrie Clark COVID19 + MONITORING  COVID19 SCREEN: 2021 Positive (outpt)  PATIENT RISK FACTORS: Age, DM    Spoke w/ Patient for initial [address] discharge call upon incoming return call from Patient.  Patient reports \"doing really good\". Reports occasional cough. Denies sob, fever, chills, malaise or other Covid19 related sx. Advised rest, pacing activity, adequated hydration and nutrition. Confirmed copy of AVS received, reviewed. Patient obtained and taking Eliquis, Decadron, Toprol as directed. Denies issue w/ cost of Eliquis copay. Med education provided. Stressed importance of not skipping doses and completing unless otherwise directed by MD.     Reviewed discharge instructions, medical action plan and Covid19 red flags such as increased shortness of breath, increasing fever and signs of decompensation. Discussed increased risk of blood clots associated w/ Covid19, sx and preventative measures. Patient verbalizes understanding. Discussed exposure protocols and quarantine with CDC Guidelines What to do if you are sick with coronavirus disease 2019. . [facility] re: any additional Covid19 questions. Patient to schedule 7 day hosp f/u appts w/ Becky Aas [facility] (PCP) and Cardiologist, denies need for assistance, has transportation. Advised to contact PCP 24/7 re: any health or Covid19 concerns. Denies resource needs including hhc, dme. HCDM: verified information on file up to date. Patient given information for [employer] and agrees to enroll. Patient's preferred e-mail:  [email or web address]  Patient's preferred phone number: [phone]  Based on Loop alert triggers, patient will be contacted by nurse care manager for worsening symptoms. Note routed to [employer] for enrollment.      Anjali Sosa RN

## 2021-02-01 NOTE — CARE COORDINATION
[facility] Transitions Initial Follow Up Call    Call within 2 business days of discharge: Yes    Patient: Mari Quintanilla Patient : 1950   MRN: [id number]  Reason for Admission: Sylvie Pallas, Jon Resp Failure, DWAYNE  Discharge Date: 21 RARS: Readmission Risk Score: 14  Facility: [address] [place] [address]       Complaint Diagnosis Description Type Department Provider    21   Admission (Discharged) [address] Scott Khan MD    21 Shortness of Breath Acute respiratory failure with hypoxia ([address] . .. ED (TRANSFER) [facility]      .  COVID19 + MONITORING  COVID19 SCREEN: 2021 Positive (outpt)  PATIENT RISK FACTORS: Age, DM    1st attempt to reach for initial Covid19 + Monitoring discharge call unsuccessful; message left requesting call back.   Wilfrid Carbajal RN

## 2021-03-02 ENCOUNTER — OFFICE VISIT (OUTPATIENT)
Dept: CARDIOLOGY CLINIC | Age: 71
End: 2021-03-02
Payer: MEDICARE

## 2021-03-02 VITALS
HEART RATE: 76 BPM | SYSTOLIC BLOOD PRESSURE: 118 MMHG | DIASTOLIC BLOOD PRESSURE: 76 MMHG | BODY MASS INDEX: 28.85 KG/M2 | OXYGEN SATURATION: 94 % | WEIGHT: 183.8 LBS | HEIGHT: 67 IN

## 2021-03-02 DIAGNOSIS — E66.3 OVERWEIGHT: ICD-10-CM

## 2021-03-02 DIAGNOSIS — U07.1 COVID-19: ICD-10-CM

## 2021-03-02 DIAGNOSIS — E78.5 DYSLIPIDEMIA: ICD-10-CM

## 2021-03-02 DIAGNOSIS — I10 ESSENTIAL HYPERTENSION: ICD-10-CM

## 2021-03-02 DIAGNOSIS — I48.91 ATRIAL FIBRILLATION, UNSPECIFIED TYPE (HCC): Primary | ICD-10-CM

## 2021-03-02 PROCEDURE — 4040F PNEUMOC VAC/ADMIN/RCVD: CPT | Performed by: INTERNAL MEDICINE

## 2021-03-02 PROCEDURE — 99213 OFFICE O/P EST LOW 20 MIN: CPT | Performed by: INTERNAL MEDICINE

## 2021-03-02 PROCEDURE — G8427 DOCREV CUR MEDS BY ELIG CLIN: HCPCS | Performed by: INTERNAL MEDICINE

## 2021-03-02 PROCEDURE — G8417 CALC BMI ABV UP PARAM F/U: HCPCS | Performed by: INTERNAL MEDICINE

## 2021-03-02 PROCEDURE — G8484 FLU IMMUNIZE NO ADMIN: HCPCS | Performed by: INTERNAL MEDICINE

## 2021-03-02 PROCEDURE — 3017F COLORECTAL CA SCREEN DOC REV: CPT | Performed by: INTERNAL MEDICINE

## 2021-03-02 PROCEDURE — 1123F ACP DISCUSS/DSCN MKR DOCD: CPT | Performed by: INTERNAL MEDICINE

## 2021-03-02 PROCEDURE — 1036F TOBACCO NON-USER: CPT | Performed by: INTERNAL MEDICINE

## 2021-03-02 PROCEDURE — 93000 ELECTROCARDIOGRAM COMPLETE: CPT | Performed by: INTERNAL MEDICINE

## 2021-03-02 RX ORDER — METOPROLOL SUCCINATE 25 MG/1
25 TABLET, EXTENDED RELEASE ORAL DAILY
Qty: 90 TABLET | Refills: 1 | Status: SHIPPED | OUTPATIENT
Start: 2021-03-02

## 2021-03-02 RX ORDER — METOPROLOL SUCCINATE 25 MG/1
25 TABLET, EXTENDED RELEASE ORAL DAILY
Qty: 90 TABLET | Refills: 1 | Status: CANCELLED | OUTPATIENT
Start: 2021-03-02

## 2021-03-02 NOTE — PROGRESS NOTES
Romayne Riding, MD                                  CARDIOLOGY  NOTE         Chief Complaint:    Chief Complaint   Patient presents with    Follow-Up from Hospital     [facility] stated he [facility] due to lack of oxygen and was really short of breath. He denies any chest pain, dizziness, palpitations, and edema. He states his shortness of breath has improved. Follow up AFIB, new onset   Recent hospitalization     HPI:     Martha Barron is a 65y.o. year old male who was recently evaluated in the hospital for new onset atrial fibrillation in the setting of COVID-19 pneumonia. Patient was started on Eliquis for [id number] score of 2. Due to bradycardia in the hospital calcium channel blocker and amiodarone were stopped. Later on however patient was started on low-dose metoprolol XL    Patient presents for follow-up today    No chest pain  No dyspnea  No palpitations  Pt is exercising well       Echo 1/25/2021       Summary   Expedited imaging was used to obtain protocol images to reduce the   sonographer's exposure during COVID-19 pandemic.   Left ventricular systolic function is normal.   Ejection fraction is visually estimated at 50-55%.   No significant valvular abnormalities.   No evidence of any pericardial effusion.   Dilation of the aortic root.        Current Outpatient Medications   Medication Sig Dispense Refill    apixaban (ELIQUIS) 5 MG TABS tablet Take 1 tablet by mouth 2 times daily 60 tablet 1    metoprolol succinate (TOPROL XL) 25 MG extended release tablet Take 1 tablet by mouth daily 30 tablet 1    atorvastatin (LIPITOR) 40 MG tablet Take 40 mg by mouth daily      glimepiride (AMARYL) 2 MG tablet Take 2 mg by mouth 2 times daily      famciclovir (FAMVIR) 500 MG tablet famciclovir 500 mg tablet      metformin (GLUCOPHAGE) 500 MG tablet Take 500 mg by mouth 3 times daily.  quinapril-hydrochlorothiazide (ACCURETIC) 20-25 MG per tablet Take 1 tablet by mouth daily. developed, Well nourished, No acute distress, Non-toxic appearance. HEENT:  Normocephalic, Atraumatic, Bilateral external ears normal, Oropharynx moist, No oral exudates,   Nose normal.   Neck- Normal range of motion, No tenderness, Supple  Eyes:  EOMI, Conjunctiva normal, No discharge. Respiratory:  Normal breath sounds, No respiratory distress, No wheezing, No Rales, No Ronchi. No chest tenderness. Cardiovascular: S1-S2, no added heart sounds, No Mumurs appreciated. No gallops, rubs. No Pedal Edema   GI:  Bowel sounds normal, Soft, No tenderness,  :  No costovertebral angle tenderness   Musculoskeletal:  No gross deformities. Back- No tenderness  Integument:  Well hydrated, no rash   Lymphatic:  No lymphadenopathy noted   Neurologic:  Alert & oriented x 3, Normal motor function, normal sensory function, no focal deficits noted   Psychiatric:  Speech and behavior appropriate       Medical decision making and Data review:    DATA:    Lab Results   Component Value Date    TROPONINT <0.010 01/27/2021     BNP:    Lab Results   Component Value Date    PROBNP 78.66 01/24/2021     PT/INR:  No results found for: PTINR  Lab Results   Component Value Date    LABA1C 9.2 (H) 01/24/2021     No results found for: CHOL, TRIG, HDL, LDLCALC, LDLDIRECT  Lab Results   Component Value Date    WBC 14.2 (H) 01/27/2021    HGB 12.7 (L) 01/27/2021    HCT 38.3 (L) 01/27/2021    MCV 83.3 01/27/2021     01/27/2021     TSH: No results found for: TSH  Lab Results   Component Value Date    AST 45 (H) 01/29/2021    ALT 90 (H) 01/29/2021    BILIDIR 0.3 01/29/2021    BILITOT 1.2 (H) 01/29/2021    ALKPHOS 43 01/29/2021         All labs, medications and tests reviewed by myself including data and history from outside source , patient and available family . 1. Atrial fibrillation, unspecified type ([facility] [address]    2. Essential hypertension    3. Dyslipidemia    4. Overweight    5. COVID-19         Impression and Plan:      1.  New onset atrial fibrillation in the setting of COVID-19 pneumonia. Now in NSR. CHADSVASC score 2.   Cont Eliquis/Toprol XL. 30 day event monitor in 6 months   2. Bradycardia with amiodarone, tolerating BB well. Cont   3. HTN: Stable. Cont ACEI/HCTZ + BB   4. DM: On Metformin, target hba1c 7  5. Hyperlipidemia: Continue with Lipitor 40 mg dailyDiet and Exercise:         Return in about 6 months (around 9/2/2021). Counseled extensively and medication compliance urged. We discussed that for the  prevention of ASCVD our  goal is aggressive risk modification. Patient is encouraged to exercise even a brisk walk for 30 minutes  at least 3 to 4 times a week   Various goals were discussed and questions answered. Continue current medications. Appropriate prescriptions are addressed and refills ordered. Questions answered and patient verbalizes understanding. Call for any problems, questions, or concerns.

## 2021-03-05 ENCOUNTER — TELEPHONE (OUTPATIENT)
Dept: CARDIOLOGY CLINIC | Age: 71
End: 2021-03-05

## 2021-03-05 NOTE — TELEPHONE ENCOUNTER
Patient called stating that he wants to stop oxygen from being delivered to him because he doesn't need it anymore. Patient was advised that our office does not order oxygen and that he should contact his PCP, patient stating that he has already tried that and that he needs someone who has checked his oxygen lately to vouch for him that no longer needs it. Please call him back at ph# [phone].

## 2021-09-02 ENCOUNTER — NURSE ONLY (OUTPATIENT)
Dept: CARDIOLOGY CLINIC | Age: 71
End: 2021-09-02
Payer: MEDICARE

## 2021-09-02 ENCOUNTER — OFFICE VISIT (OUTPATIENT)
Dept: CARDIOLOGY CLINIC | Age: 71
End: 2021-09-02
Payer: MEDICARE

## 2021-09-02 VITALS
HEIGHT: 67 IN | BODY MASS INDEX: 29.95 KG/M2 | DIASTOLIC BLOOD PRESSURE: 84 MMHG | SYSTOLIC BLOOD PRESSURE: 126 MMHG | HEART RATE: 69 BPM | WEIGHT: 190.8 LBS

## 2021-09-02 DIAGNOSIS — R00.1 BRADYCARDIA: ICD-10-CM

## 2021-09-02 DIAGNOSIS — I48.91 ATRIAL FIBRILLATION, UNSPECIFIED TYPE (HCC): Primary | ICD-10-CM

## 2021-09-02 DIAGNOSIS — I10 ESSENTIAL HYPERTENSION: Primary | ICD-10-CM

## 2021-09-02 DIAGNOSIS — E78.5 DYSLIPIDEMIA: ICD-10-CM

## 2021-09-02 DIAGNOSIS — I48.91 ATRIAL FIBRILLATION, UNSPECIFIED TYPE (HCC): ICD-10-CM

## 2021-09-02 PROCEDURE — G8427 DOCREV CUR MEDS BY ELIG CLIN: HCPCS | Performed by: INTERNAL MEDICINE

## 2021-09-02 PROCEDURE — 4040F PNEUMOC VAC/ADMIN/RCVD: CPT | Performed by: INTERNAL MEDICINE

## 2021-09-02 PROCEDURE — 3017F COLORECTAL CA SCREEN DOC REV: CPT | Performed by: INTERNAL MEDICINE

## 2021-09-02 PROCEDURE — 99214 OFFICE O/P EST MOD 30 MIN: CPT | Performed by: INTERNAL MEDICINE

## 2021-09-02 PROCEDURE — 1036F TOBACCO NON-USER: CPT | Performed by: INTERNAL MEDICINE

## 2021-09-02 PROCEDURE — 1123F ACP DISCUSS/DSCN MKR DOCD: CPT | Performed by: INTERNAL MEDICINE

## 2021-09-02 PROCEDURE — 93228 REMOTE 30 DAY ECG REV/REPORT: CPT | Performed by: INTERNAL MEDICINE

## 2021-09-02 PROCEDURE — 93000 ELECTROCARDIOGRAM COMPLETE: CPT | Performed by: INTERNAL MEDICINE

## 2021-09-02 PROCEDURE — G8417 CALC BMI ABV UP PARAM F/U: HCPCS | Performed by: INTERNAL MEDICINE

## 2021-09-02 RX ORDER — FENOFIBRATE 145 MG/1
TABLET, COATED ORAL
COMMUNITY
Start: 2021-07-24

## 2021-09-02 NOTE — PROGRESS NOTES
Millie Hassan MD                                  CARDIOLOGY  NOTE         Chief Complaint:    Chief Complaint   Patient presents with    6 Month Follow-Up      Follow up AFIB   Doing well   No acut distress     Prior HPI:     Levar Austin is a 65y.o. year old male who was recently evaluated in the hospital for new onset atrial fibrillation in the setting of COVID-19 pneumonia. Patient was started on Eliquis for [id number] score of 2. Due to bradycardia in the hospital calcium channel blocker and amiodarone were stopped. Later on however patient was started on low-dose metoprolol XL    Patient presents for follow-up today    No chest pain  No dyspnea  No palpitations  Pt is exercising well       Echo 1/25/2021       Summary   Expedited imaging was used to obtain protocol images to reduce the   sonographer's exposure during COVID-19 pandemic.   Left ventricular systolic function is normal.   Ejection fraction is visually estimated at 50-55%.   No significant valvular abnormalities.   No evidence of any pericardial effusion.   Dilation of the aortic root.        Current Outpatient Medications   Medication Sig Dispense Refill    fenofibrate (TRICOR) 145 MG tablet TAKE 1 TABLET BY MOUTH EVERY DAY      metoprolol succinate (TOPROL XL) 25 MG extended release tablet Take 1 tablet by mouth daily 90 tablet 1    apixaban (ELIQUIS) 5 MG TABS tablet Take 1 tablet by mouth 2 times daily 180 tablet 1    atorvastatin (LIPITOR) 40 MG tablet Take 40 mg by mouth daily      glimepiride (AMARYL) 2 MG tablet Take 2 mg by mouth 2 times daily      famciclovir (FAMVIR) 500 MG tablet famciclovir 500 mg tablet      metformin (GLUCOPHAGE) 500 MG tablet Take 500 mg by mouth 3 times daily.  quinapril-hydrochlorothiazide (ACCURETIC) 20-25 MG per tablet Take 1 tablet by mouth daily. No current facility-administered medications for this visit. Allergies:     Patient has no known allergies.     Patient History:    Past Medical History:   Diagnosis Date    COVID-19     Diabetes mellitus ([facility] [address]     Hyperlipidemia     Hypertension      Past Surgical History:   Procedure Laterality Date    COLONOSCOPY  06/01/2015    normal colonoscopy    ENDOSCOPY, COLON, DIAGNOSTIC      prepyloric uler    UPPER GASTROINTESTINAL ENDOSCOPY       No family history on file. Social History     Tobacco Use    Smoking status: Former Smoker    Smokeless tobacco: Never Used   Substance Use Topics    Alcohol use: No        Review of Systems:     · Constitutional:  No Fever or Weight Loss   · Eyes: No Decreased Vision  · ENT: No Headaches, Hearing Loss or Vertigo  · Cardiovascular: No Chest Pain,  No Shortness of breath, No Palpitations. No Edema   · Respiratory: No cough or wheezing . No Respiratory distress   · Gastrointestinal: No abdominal pain, appetite loss, blood in stools, constipation, diarrhea or heartburn  · Genitourinary: No dysuria, trouble voiding, or hematuria  · Musculoskeletal:  denies any new  joint aches , or pain   · Integumentary: No rash or pruritis  · Neurological: No TIA or stroke symptoms  · Psychiatric: No anxiety or depression  · Endocrine: No malaise, fatigue or temperature intolerance  · Hematologic/Lymphatic: No bleeding problems, blood clots or swollen lymph nodes  · Allergic/Immunologic: No nasal congestion or hives        Objective:      Physical Exam:    /84 (Site: Left Upper Arm, Position: Sitting, Cuff Size: Medium Adult)   Pulse 69   Ht 5' 7\" (1.702 m)   Wt 190 lb 12.8 oz (86.5 kg)   BMI 29.88 kg/m²   Wt Readings from Last 3 Encounters:   09/02/21 190 lb 12.8 oz (86.5 kg)   03/02/21 183 lb 12.8 oz (83.4 kg)   01/24/21 177 lb 6.4 oz (80.5 kg)     Body mass index is 29.88 kg/m². Vitals:    09/02/21 0809   BP: 126/84   Pulse: 69        General Appearance and Constitutional: Conversant, Well developed, Well nourished, No acute distress, Non-toxic appearance.    HEENT:  Normocephalic, Atraumatic, Bilateral external ears normal, Oropharynx moist, No oral exudates,   Nose normal.   Neck- Normal range of motion, No tenderness, Supple  Eyes:  EOMI, Conjunctiva normal, No discharge. Respiratory:  Normal breath sounds, No respiratory distress, No wheezing, No Rales, No Ronchi. No chest tenderness. Cardiovascular: S1-S2, no added heart sounds, No Mumurs appreciated. No gallops, rubs. No Pedal Edema   GI:  Bowel sounds normal, Soft, No tenderness,  :  No costovertebral angle tenderness   Musculoskeletal:  No gross deformities. Back- No tenderness  Integument:  Well hydrated, no rash   Lymphatic:  No lymphadenopathy noted   Neurologic:  Alert & oriented x 3, Normal motor function, normal sensory function, no focal deficits noted   Psychiatric:  Speech and behavior appropriate       Medical decision making and Data review:    DATA:    Lab Results   Component Value Date    TROPONINT <0.010 01/27/2021     BNP:    Lab Results   Component Value Date    PROBNP 78.66 01/24/2021     PT/INR:  No results found for: PTINR  Lab Results   Component Value Date    LABA1C 9.2 (H) 01/24/2021     No results found for: CHOL, TRIG, HDL, LDLCALC, LDLDIRECT  Lab Results   Component Value Date    WBC 14.2 (H) 01/27/2021    HGB 12.7 (L) 01/27/2021    HCT 38.3 (L) 01/27/2021    MCV 83.3 01/27/2021     01/27/2021     TSH: No results found for: TSH  Lab Results   Component Value Date    AST 45 (H) 01/29/2021    ALT 90 (H) 01/29/2021    BILIDIR 0.3 01/29/2021    BILITOT 1.2 (H) 01/29/2021    ALKPHOS 43 01/29/2021         All labs, medications and tests reviewed by myself including data and history from outside source , patient and available family . 1. Essential hypertension    2. Atrial fibrillation, unspecified type ([address]    3. Bradycardia    4. Dyslipidemia         Impression and Plan:      1. New onset atrial fibrillation in the setting of COVID-19 pneumonia  / Likely situational . EKG today: Now in NSR.  CHADSVASC score 2.   Cont Eliquis/Toprol XL. Obtain follow up 30 day event monitor if no AFIB, will take him off [address]   2. Bradycardia with amiodarone, tolerating BB well. Cont   3. HTN: Stable. Cont ACEI/HCTZ + BB   4. DM: On Metformin, target hba1c 7  5. Hyperlipidemia: Continue with Lipitor 40 mg dailyDiet and Exercise:         Return in about 3 months (around 12/2/2021). Counseled extensively and medication compliance urged. We discussed that for the  prevention of ASCVD our  goal is aggressive risk modification. Patient is encouraged to exercise even a brisk walk for 30 minutes  at least 3 to 4 times a week   Various goals were discussed and questions answered. Continue current medications. Appropriate prescriptions are addressed and refills ordered. Questions answered and patient verbalizes understanding. Call for any problems, questions, or concerns.

## 2021-09-02 NOTE — PROGRESS NOTES
30 days e-cardio monitor placed.  # [id number]. Instructed patient on how to record the event and to call monitoring center at [phone] if any problems arise. Instructed patient to disconnect the lead wires from the electrodes before bathing or showering and reattach them afterwards. Instructed patient that the electrodes should be changed every 3 days or if they no longer adhere to the skin. Patient to mail package after the monitor has ended. Patient verbalized understanding.

## 2021-09-02 NOTE — PATIENT INSTRUCTIONS
**It is YOUR responsibilty to bring medication bottles and/or updated medication list to [address] [place] [address]. This will allow us to better serve you and all your healthcare needs**    Please be informed that if you contact our office outside of normal business hours the physician on call cannot help with any scheduling or rescheduling issues, procedure instruction questions or any type of medication issue. We advise you for any urgent/emergency that you go to the nearest emergency room!     PLEASE CALL OUR OFFICE DURING NORMAL BUSINESS HOURS    Monday - Friday   8 am to 5 pm    [place]: [address]: [phone]    [place]:  [phone]

## 2021-10-13 ENCOUNTER — TELEPHONE (OUTPATIENT)
Dept: CARDIOLOGY CLINIC | Age: 71
End: 2021-10-13

## 2021-10-13 NOTE — TELEPHONE ENCOUNTER
Left message advising patient of normal event monitor results.     Normal Sinus Rhythm   1 episode of 6 beats of non sustained VTACH - no significant PVCs otherwise   No Atrial Fibrillation noted     RECOMMENDATIONS:   Medical Management